# Patient Record
Sex: FEMALE | Race: WHITE | Employment: FULL TIME
[De-identification: names, ages, dates, MRNs, and addresses within clinical notes are randomized per-mention and may not be internally consistent; named-entity substitution may affect disease eponyms.]

---

## 2017-03-14 ENCOUNTER — HOSPITAL ENCOUNTER (OUTPATIENT)
Dept: MRI IMAGING | Facility: CLINIC | Age: 42
Discharge: HOME | End: 2017-03-14
Payer: COMMERCIAL

## 2017-03-14 DIAGNOSIS — M25.572 LEFT ANKLE PAIN, UNSPECIFIED CHRONICITY: ICD-10-CM

## 2017-03-14 PROCEDURE — 73721 MRI JNT OF LWR EXTRE W/O DYE: CPT

## 2017-09-28 ENCOUNTER — HOSPITAL ENCOUNTER (OUTPATIENT)
Dept: CT IMAGING | Facility: CLINIC | Age: 42
Discharge: HOME | End: 2017-09-28
Payer: COMMERCIAL

## 2017-09-28 DIAGNOSIS — M79.672 PAIN OF LEFT HEEL: ICD-10-CM

## 2017-09-28 PROCEDURE — 73700 CT LOWER EXTREMITY W/O DYE: CPT

## 2018-06-18 ENCOUNTER — OFFICE VISIT (OUTPATIENT)
Dept: ORTHOPEDIC SURGERY | Age: 43
End: 2018-06-18
Payer: COMMERCIAL

## 2018-06-18 VITALS
SYSTOLIC BLOOD PRESSURE: 117 MMHG | DIASTOLIC BLOOD PRESSURE: 85 MMHG | WEIGHT: 216.8 LBS | HEART RATE: 93 BPM | BODY MASS INDEX: 34.03 KG/M2 | HEIGHT: 67 IN

## 2018-06-18 DIAGNOSIS — M25.375 SUBTALAR JOINT INSTABILITY, LEFT: Primary | ICD-10-CM

## 2018-06-18 PROCEDURE — 99213 OFFICE O/P EST LOW 20 MIN: CPT | Performed by: ORTHOPAEDIC SURGERY

## 2018-06-18 RX ORDER — CITALOPRAM 40 MG/1
40 TABLET ORAL
COMMUNITY
Start: 2016-04-18

## 2018-06-18 RX ORDER — IBUPROFEN 800 MG/1
800 TABLET ORAL
Status: ON HOLD | COMMUNITY
Start: 2016-05-27 | End: 2020-10-23 | Stop reason: HOSPADM

## 2018-06-18 RX ORDER — HYDROXYZINE PAMOATE 25 MG/1
25 CAPSULE ORAL
COMMUNITY
Start: 2018-02-27

## 2018-06-18 RX ORDER — TRAZODONE HYDROCHLORIDE 50 MG/1
TABLET ORAL
COMMUNITY
Start: 2018-03-02 | End: 2020-10-09

## 2018-06-18 RX ORDER — TRAMADOL HYDROCHLORIDE 50 MG/1
50 TABLET ORAL
Status: ON HOLD | COMMUNITY
Start: 2016-11-28 | End: 2020-10-23 | Stop reason: HOSPADM

## 2018-06-18 RX ORDER — GABAPENTIN 300 MG/1
CAPSULE ORAL
COMMUNITY
Start: 2018-03-09

## 2018-06-18 RX ORDER — ORPHENADRINE CITRATE 100 MG/1
100 TABLET, EXTENDED RELEASE ORAL
Status: ON HOLD | COMMUNITY
Start: 2017-09-26 | End: 2020-10-23 | Stop reason: HOSPADM

## 2018-07-16 ENCOUNTER — OFFICE VISIT (OUTPATIENT)
Dept: ORTHOPEDIC SURGERY | Age: 43
End: 2018-07-16
Payer: COMMERCIAL

## 2018-07-16 VITALS
HEART RATE: 100 BPM | DIASTOLIC BLOOD PRESSURE: 73 MMHG | WEIGHT: 219 LBS | BODY MASS INDEX: 34.37 KG/M2 | SYSTOLIC BLOOD PRESSURE: 110 MMHG | HEIGHT: 67 IN

## 2018-07-16 DIAGNOSIS — M19.072 ARTHRITIS OF LEFT ANKLE: Primary | ICD-10-CM

## 2018-07-16 PROCEDURE — 20610 DRAIN/INJ JOINT/BURSA W/O US: CPT | Performed by: ORTHOPAEDIC SURGERY

## 2018-07-16 RX ORDER — BETAMETHASONE SODIUM PHOSPHATE AND BETAMETHASONE ACETATE 3; 3 MG/ML; MG/ML
12 INJECTION, SUSPENSION INTRA-ARTICULAR; INTRALESIONAL; INTRAMUSCULAR; SOFT TISSUE ONCE
Status: COMPLETED | OUTPATIENT
Start: 2018-07-16 | End: 2018-07-16

## 2018-07-16 RX ORDER — BUPIVACAINE HYDROCHLORIDE 5 MG/ML
30 INJECTION, SOLUTION PERINEURAL ONCE
Status: COMPLETED | OUTPATIENT
Start: 2018-07-16 | End: 2018-07-16

## 2018-07-16 RX ADMIN — BETAMETHASONE SODIUM PHOSPHATE AND BETAMETHASONE ACETATE 12 MG: 3; 3 INJECTION, SUSPENSION INTRA-ARTICULAR; INTRALESIONAL; INTRAMUSCULAR; SOFT TISSUE at 16:27

## 2018-07-16 RX ADMIN — BUPIVACAINE HYDROCHLORIDE 150 MG: 5 INJECTION, SOLUTION PERINEURAL at 16:27

## 2018-07-16 NOTE — PROGRESS NOTES
Subjective:      Patient ID: Aruna Alston is a 38 y.o. female. HPI  Patient comes in today for evaluation of her left foot. She had a calcaneus fracture and has developed posttraumatic subtalar arthritis. We did get approval for injection. Continues to have pain especially with weightbearing it's achy deep pain also some swelling with the weather   Current Outpatient Prescriptions   Medication Sig Dispense Refill    citalopram (CELEXA) 40 MG tablet Take 40 mg by mouth      ibuprofen (ADVIL;MOTRIN) 800 MG tablet Take 800 mg by mouth      traMADol (ULTRAM) 50 MG tablet Take 50 mg by mouth. [place] gabapentin (NEURONTIN) 300 MG capsule 3 tabs q HS      hydrOXYzine (VISTARIL) 25 MG capsule Take 25 mg by mouth      orphenadrine (NORFLEX) 100 MG extended release tablet Take 100 mg by mouth      traZODone (DESYREL) 50 MG tablet 1/2 to 2 tabs q hs prn for insomnia       Current Facility-Administered Medications   Medication Dose Route Frequency Provider Last Rate Last Dose    betamethasone acetate-betamethasone sodium phosphate (CELESTONE) injection 12 mg  12 mg Intra-articular Once Yesi Live MD        bupivacaine (MARCAINE) 0.5 % injection 150 mg  30 mL Intra-articular Once Yesi Live MD         Review of Systems  Past Medical History:   Diagnosis Date    Anxiety     Depression     Fibromyalgia      No past surgical history on file. Family History   Problem Relation Age of Onset    Hypertension Mother      Social History   Substance Use Topics    Smoking status: Never Smoker    Smokeless tobacco: Never Used    Alcohol use Yes       Objective:     Vitals:    07/16/18 1546   BP: 110/73   Pulse: 100   Weight: 219 lb (99.3 kg)   Height: 5' 7\" (1.702 m)     Physical Exam  On exam the patient is alert and oriented ×3 and appears well She does walk with a slight limp exam shows a valgus heel. She has limited tibiotalar and subtalar motion. She's tender across the subtalar joint.   He

## 2020-07-01 ENCOUNTER — OFFICE VISIT (OUTPATIENT)
Dept: ORTHOPEDIC SURGERY | Age: 45
End: 2020-07-01
Payer: COMMERCIAL

## 2020-07-01 VITALS
SYSTOLIC BLOOD PRESSURE: 130 MMHG | BODY MASS INDEX: 34.36 KG/M2 | HEART RATE: 102 BPM | WEIGHT: 218.92 LBS | HEIGHT: 67 IN | DIASTOLIC BLOOD PRESSURE: 93 MMHG

## 2020-07-01 PROCEDURE — 99213 OFFICE O/P EST LOW 20 MIN: CPT | Performed by: ORTHOPAEDIC SURGERY

## 2020-07-05 NOTE — PROGRESS NOTES
[facility]  [facility]  [facility] [address]  Dept: [phone]    Ambulatory Orthopedic Consult      CHIEF COMPLAINT:    Chief Complaint   Patient presents with    Ankle Pain     left ankle       HISTORY OF PRESENT ILLNESS:      The patient is a 45 y.o. female who is being seen for consultation and evaluation of pain at the left lateral hindfoot, which began due to an injury on 12/7/2016 secondary to a fall down stairs while at work (treated nonoperatively). The pain is described mainly with mechanical terms (dull/sharp/throbbing). The pain is worse with activity and better with rest. The patient reports a progressive course. The patient has tried:   [place]    [x]  rest/activity modification          [x]  NSAIDs      []  opiates      []  orthotics        []  change in shoes   []  home exercises  []  physical therapy      [x]  CAM boot     []  brace:    [x]  injection:       []  surgery:      She was previously seen by Dr. Erin Singer for this issue. REVIEW OF SYSTEMS:  Constitutional: Negative for fever. HENT: Negative for tinnitus. Eyes: Negative for pain. Respiratory: Negative for shortness of breath. Cardiovascular: Negative for chest pain. Gastrointestinal: Negative for abdominal pain. Genitourinary: Negative for dysuria. Skin: Negative for rash. Neurological: Negative for headaches. Hematological: Does not bruise/bleed easily. Musculoskeletal: See HPI for pertinent positives     Past Medical History:    She  has a past medical history of Anxiety, Depression, and Fibromyalgia. Past Surgical History:    She  has no past surgical history on file.      Current Medications:     Current Outpatient Medications:     citalopram (CELEXA) 40 MG tablet, Take 40 mg by mouth, Disp: , Rfl:     ibuprofen (ADVIL;MOTRIN) 800 MG tablet, Take 800 mg by mouth, Disp: , Rfl:     traMADol (ULTRAM) 50 MG tablet, Take 50 rash/lesions/AV malformations. Strength: Able to fire/perform the following with appropriate strength:    [x]  Tib Ant:     [x]  Gastroc-Soleus:         [x]  Inversion:    [x]  Eversion:         [x]  FHL:     [x]  EHL:      Motion:  Normal for the following joints:    [x]  Ankle:     []  Subtalar: Decreased      [x]  1st MTP:      []  1st TMT:            Tenderness to Palpation:    Tenderness to palpation: Diffusely throughout the lateral hindfoot and across the anterior ankle joint line      RADIOLOGY:   7/1/2020 FINDINGS:  Three weightbearing views (AP, Mortise, and Lateral) of the left ankle and three weightbearing views (AP, Oblique, Lateral) of the left foot and 2 weightbearing views (axial and lateral) of the bilateral calcaneus were obtained in the office today and reviewed, revealing no acute fracture, dislocation, or radioopaque foreign body/tumor. Degenerative changes of the left subtalar joint with joint narrowing, sclerosis, and osteophytes, along with loss of calcaneal length and height, as well as evidence of anterior ankle impingement    IMPRESSION:  No acute fracture/dislocation. Degenerative changes as above. Electronically signed by Janelle Dodd MD      ASSESSMENT AND PLAN:  Nayla Slater was seen today for Ankle Pain (left ankle)  The primary encounter diagnosis was Arthritis of subtalar joint. Diagnoses of Impingement syndrome of left ankle and Equinus contracture of ankle were also pertinent to this visit. Body mass index is 34.28 kg/m². She has left posttraumatic subtalar arthritis, with a history of a calcaneus fracture (treated nonoperatively), along with left anterior ankle impingement; this occurred secondary to an injury on 12/7/2016 at work. Notably, she has the past medical history as above, including but not limited to the following:  Psoriasis, fibromyalgia, and anxiety/depression.     I had a long discussion today with the patient about the likely diagnosis and its natural history, physical exam and imaging findings, as well as treatment options in detail. We discussed rest/activity modification, swelling control, NSAIDs/Acetaminophen/topical anesthetics, orthotics/shoewear modification, bracing/immobilization, injections, and physical therapy. Surgically, we discussed a left subtalar arthrodesis with calcaneal osteotomy (resource in height and slide medially), along with a SETH, and possible bone graft harvest.  While discussing surgery, she does state that she is [employer] Witness, and would not accept blood products during surgery (I do not foresee any problem with this, and I did ask her to be sure to tell the anesthesia team when the time comes as well). At this point, she does wish to proceed with surgery, having failed conservative management as above. The patient wishes to proceed with the recommendations as above. Orders/referrals were placed as below at today's visit. In order to know exactly how to proceed surgically, a CT was ordered today for preoperative planning. This is medically necessary to evaluate the exact bony alignment/architecture. Given the worker's compensation claim associated with the patient's diagnosis, appropriate paperwork will be filled out by our office regarding today's visit. All questions were answered and the patient agrees with the above plan. The patient will return to clinic after the CT has been obtained without x-rays. At her next visit, we will most likely pick a surgical date and submit surgical booking paperwork. No follow-ups on file. No orders of the defined types were placed in this encounter. Orders Placed This Encounter   Procedures    CT Ankle Left WO Contrast     Ankle/hindfoot: Reformats     [place] coronal reformats using axial image at the level of the tibial fibular syndesmosis. Adjust coronal reformats plane to bisect the tibia and fibula. Collimation 2 mm.     [place] the sagittal reformats plane using the same image, peripendicular to the coronal reformats plane. Collimation is 2 mm. Standing Status:   Future     Standing Expiration Date:   7/1/2021     Scheduling Instructions: Ankle must be at neutral (perpendicular to tibia) with toes pointed directly up. Please center the ankle in the scanner (to include 3 inches above tibial plafond), and include the entire foot. Order Specific Question:   Reason for exam:     Answer:   eval ST joint for preop planning    CT Foot Left WO Contrast     Fore/midfoot: Reformats     [place] axial reformats using routine reformat sagittal image that shows the entire first metatarsal (may have to oblique this image to see the entire first metatarsal). Adjust axial reformats plane to parallel the long axis of the first metatarsal. Collimation is 2 mm. [place] coronal reformats using the same image, perpendicular to the axial reformats. Collimation is 2 mm. [place] the sagittal reformats plane using the axial reformat image that shows the entire first metatarsal. Adjust the sagittal reformats plane to parallel the long axis of the first metatarsal. Collimation 2 mm. Standing Status:   Future     Standing Expiration Date:   7/1/2021     Scheduling Instructions: Ankle must be at neutral (perpendicular to tibia) with toes pointed directly up. Please center the ankle in the scanner (to include 3 inches above tibial plafond), and include the entire foot.      Order Specific Question:   Reason for exam:     Answer:   eval ST joint for preop planning    XR CALCANEUS LEFT (MIN 2 VIEWS)     2 WEIGHT BEARING VIEWS:  AXIAL + LATERAL (to include whole foot)     Standing Status:   Future     Number of Occurrences:   1     Standing Expiration Date:   8/1/2020     Order Specific Question:   Reason for exam:     Answer:   eval alignment    XR CALCANEUS RIGHT (MIN 2 VIEWS)     2 WEIGHT BEARING VIEWS:  AXIAL + LATERAL (to include whole foot)     Standing Status:

## 2020-08-17 ENCOUNTER — HOSPITAL ENCOUNTER (OUTPATIENT)
Dept: CT IMAGING | Facility: CLINIC | Age: 45
Discharge: HOME | End: 2020-08-19
Payer: COMMERCIAL

## 2020-08-17 PROCEDURE — 73700 CT LOWER EXTREMITY W/O DYE: CPT

## 2020-08-21 ENCOUNTER — OFFICE VISIT (OUTPATIENT)
Dept: ORTHOPEDIC SURGERY | Age: 45
End: 2020-08-21
Payer: COMMERCIAL

## 2020-08-21 VITALS
WEIGHT: 218 LBS | TEMPERATURE: 97.1 F | SYSTOLIC BLOOD PRESSURE: 131 MMHG | HEART RATE: 104 BPM | HEIGHT: 67 IN | DIASTOLIC BLOOD PRESSURE: 88 MMHG | BODY MASS INDEX: 34.21 KG/M2

## 2020-08-21 PROCEDURE — 99214 OFFICE O/P EST MOD 30 MIN: CPT | Performed by: ORTHOPAEDIC SURGERY

## 2020-08-21 NOTE — PROGRESS NOTES
[facility]  [facility]  [address] [place] [address]  Dept: [phone]    Ambulatory Orthopedic Consult      CHIEF COMPLAINT:    Chief Complaint   Patient presents with    Ankle Pain     left ankle       HISTORY OF PRESENT ILLNESS:      The patient is a 45 y.o. female who is being seen for consultation and evaluation of pain at the left lateral hindfoot, which began due to an injury on 12/7/2016 secondary to a fall down stairs while at work (treated nonoperatively). The pain is described mainly with mechanical terms (dull/sharp/throbbing). The pain is worse with activity and better with rest. The patient reports a progressive course. The patient has tried:   Veronica Oh    [x]  rest/activity modification          [x]  NSAIDs      []  opiates      []  orthotics        []  change in shoes   []  home exercises  []  physical therapy      [x]  CAM boot     []  brace:    [x]  injection:       []  surgery:      She was previously seen by Dr. Eamon Steiner for this issue. INTERVAL HISTORY 8/21/2020:  She is seen again today in the office for follow up of a CT scan. Since being seen last, the patient is doing worse. She is ambulating today using regular shoes without a brace or assistive device. The location and quality of the pain have not significantly changed since the last visit. She is here today with her brother to discuss surgery. REVIEW OF SYSTEMS:  Constitutional: Negative for fever. HENT: Negative for tinnitus. Eyes: Negative for pain. Respiratory: Negative for shortness of breath. Cardiovascular: Negative for chest pain. Gastrointestinal: Negative for abdominal pain. Genitourinary: Negative for dysuria. Skin: Negative for rash. Neurological: Negative for headaches. Hematological: Does not bruise/bleed easily.    Musculoskeletal: See HPI for pertinent positives     Past Medical History:    She  has a past medical history of Anxiety, Depression, and Fibromyalgia. Past Surgical History:    She  has no past surgical history on file. Current Medications:     Current Outpatient Medications:     citalopram (CELEXA) 40 MG tablet, Take 40 mg by mouth, Disp: , Rfl:     ibuprofen (ADVIL;MOTRIN) 800 MG tablet, Take 800 mg by mouth, Disp: , Rfl:     traMADol (ULTRAM) 50 MG tablet, Take 50 mg by mouth. ., Disp: , Rfl:     gabapentin (NEURONTIN) 300 MG capsule, 3 tabs q HS, Disp: , Rfl:     hydrOXYzine (VISTARIL) 25 MG capsule, Take 25 mg by mouth, Disp: , Rfl:     orphenadrine (NORFLEX) 100 MG extended release tablet, Take 100 mg by mouth, Disp: , Rfl:     traZODone (DESYREL) 50 MG tablet, 1/2 to 2 tabs q hs prn for insomnia, Disp: , Rfl:      Allergies:    Patient has no known allergies. Family History:  family history includes Hypertension in her mother. Social History:   Social History     Occupational History    Not on file   Tobacco Use    Smoking status: Never Smoker    Smokeless tobacco: Never Used   Substance and Sexual Activity    Alcohol use: Yes    Drug use: No    Sexual activity: Not on file     Occupation: Full-time medical billing. 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 She reports that her   in 2019 from stage IV colon cancer. OBJECTIVE:  /88   Pulse 104   Temp 97.1 °F (36.2 °C)   Ht 5' 7\" (1.702 m)   Wt 218 lb (98.9 kg)   BMI 34.14 kg/m²    Psych: alert and oriented to person, time, and place  Cardio:  well perfused extremities  Resp:  normal respiratory effort  Skin:  no cyanosis  Hem/lymph:  no lymphedema  Neuro:  sensation to light touch grossly intact throughout all nerve distributions in the foot   Musculoskeletal:    RLE:  Alignment:  Heel neutral   Vascular: Toes warm and well perfused, compartments soft/compressible. No significant swelling of foot. Skin: Intact without rash/lesions/AV malformations.   Strength: Able to fire/perform the following with appropriate strength:    [x]  Tib Ant:     [x]  Gastroc-Soleus:         [x]  Inversion:    [x]  Eversion:         [x]  FHL:     [x]  EHL:      Motion:  Normal for the following joints:    [x]  Ankle:     [x]  Subtalar:       [x]  1st MTP:      []  1st TMT:            Tenderness to Palpation:    Tenderness to palpation: None      LLE:  Alignment:  Heel valgus, asymmetric to contralateral  Vascular: Toes warm and well perfused, compartments soft/compressible. No significant swelling of foot. Skin: Intact without rash/lesions/AV malformations. Strength: Able to fire/perform the following with appropriate strength:    [x]  Tib Ant:     [x]  Gastroc-Soleus:         [x]  Inversion:    [x]  Eversion:         [x]  FHL:     [x]  EHL:      Motion:  Normal for the following joints:    [x]  Ankle:     []  Subtalar: Decreased      [x]  1st MTP:      []  1st TMT:            Tenderness to Palpation:    Tenderness to palpation: Diffusely throughout the lateral hindfoot and across the anterior ankle joint line      RADIOLOGY:   8/21/2020 Prior images reviewed for reference. CT images and radiology report reviewed, as below:    CT ankle:         1. Calcaneus/hindfoot valgus.  Moderate degenerative changes of the subtalar    joint.  Diffuse osteopenia. 2. Os trigonum. 3. Mild soft tissue swelling and edema of the medial left great toe.  Mild    edema in the subcutaneous fat about the ankle and foot. 4. No acute osseous abnormality. CT foot:         1. Calcaneus/hindfoot valgus.  Moderate degenerative changes at the subtalar    joint.  Diffuse osteopenia. 2. Os trigonum. 3. Mild edema and soft tissue swelling at the medial left great toe and about    the ankle and foot.     4. No acute osseous abnormality.                 FINDINGS:  Three weightbearing views (AP, Mortise, and Lateral) of the left ankle and three weightbearing views (AP, Oblique, Lateral) of the left foot and 2 weightbearing views (axial and lateral) of the bilateral calcaneus were obtained in the office today and reviewed, revealing no acute fracture, dislocation, or radioopaque foreign body/tumor. Degenerative changes of the left subtalar joint with joint narrowing, sclerosis, and osteophytes, along with loss of calcaneal length and height, as well as evidence of anterior ankle impingement    IMPRESSION:  No acute fracture/dislocation. Degenerative changes as above. Electronically signed by Marissa Mullins MD      ASSESSMENT AND PLAN:  Becca Rhodes was seen today for Ankle Pain (left ankle)  The primary encounter diagnosis was Arthritis of subtalar joint. Diagnoses of Impingement syndrome of left ankle and Equinus contracture of ankle were also pertinent to this visit. Body mass index is 34.14 kg/m². She has left posttraumatic subtalar arthritis, with a history of a calcaneus fracture (treated nonoperatively), along with left anterior ankle impingement; this occurred secondary to an injury on 12/7/2016 at work. Notably, she has the past medical history as above, including but not limited to the following:  Psoriasis, fibromyalgia, and anxiety/depression. I had another discussion today with the patient about the likely diagnosis and its natural history, physical exam and imaging findings, as well as treatment options in detail. We discussed rest/activity modification, swelling control, NSAIDs/Acetaminophen/topical anesthetics, orthotics/shoewear modification, bracing/immobilization, injections, and physical therapy. Surgically, we discussed a left subtalar arthrodesis with calcaneal osteotomy (possible medial slide by about 10 mm plus about 7 to 8 mm of height), SETH and possible bone graft harvest.  She has tried extensive conservative management in the past for this problem, including rest/activity modification, NSAIDs, cam boot, and an injection, without any significant improvement.   Due to the severity of her arthritis, I do not DME Order for (Specify) as OP     - DME device ordered:  rolling walker   - Length of Need:  3 Months    DME Order for (Specify) as OP     - DME device ordered:  rolling knee scooter   - Length of Need:  3 Months    DME Order for (Specify) as OP     - DME device ordered:  crutches   - Length of Need:  3 Months         Minda Minor MD  Orthopedic Surgery, Foot and Ankle        Please excuse any typos/errors, as this note was created with the assistance of voice recognition software. While intending to generate a document that actually reflects the content of the visit, the document can still have some errors including those of syntax and sound-a-like substitutions which may escape proof reading. In such instances, actual meaning can be extrapolated by context.

## 2020-09-29 ENCOUNTER — TELEPHONE (OUTPATIENT)
Dept: ORTHOPEDIC SURGERY | Age: 45
End: 2020-09-29

## 2020-09-29 NOTE — TELEPHONE ENCOUNTER
LM on [address] [place] [address] letting her know I was returning her call to see what information she still needed regarding Luiza's approved Surgery.   Requested a CB to review

## 2020-10-15 ENCOUNTER — HOSPITAL ENCOUNTER (OUTPATIENT)
Dept: PHYSICAL THERAPY | Facility: CLINIC | Age: 45
Setting detail: THERAPIES SERIES
Discharge: HOME | End: 2020-10-15
Payer: COMMERCIAL

## 2020-10-15 PROCEDURE — 97161 PT EVAL LOW COMPLEX 20 MIN: CPT

## 2020-10-15 PROCEDURE — 97116 GAIT TRAINING THERAPY: CPT

## 2020-10-15 NOTE — CONSULTS
[x] THE [facility] &  Therapy  [facility] [address]   [place]: [phone]  F: [phone]         Physical Therapy Evaluation    Date:  10/15/2020   Patient: Марина Meehan  : 1975  MRN: [id number]  Physician: Dr Wilberto Ochoa: Imelda Nesbitt (VERIFICATION PENDING)  Medical Diagnosis: LLE Ankle OA subtalar, impingement syndrome, equinus contracture   Rehab Codes: M19.079, M25.872, M24.573  Onset date: 16   Next 's appt.:     Subjective:   CC/HPI: Pt with LLE lateral hindfoot pain, injured on 16 after falling down the stairs at work. She has failed conservative treatment and has opted for surgery on 10-23-20  for left subtalar arthrodesis with calcaneal- osteotomy, SETH and possible bone graft harvest.      PMHx:   has a past medical history of Anxiety, Depression, and Fibromyalgia. Tests: [] X-Ray:    [] MRI:    [x] Other:   CT ankle:         1. Calcaneus/hindfoot valgus.  Moderate degenerative changes of the subtalar    joint.  Diffuse osteopenia. 2. Os trigonum. 3. Mild soft tissue swelling and edema of the medial left great toe.  Mild    edema in the subcutaneous fat about the ankle and foot. 4. No acute osseous abnormality. CT foot:         1. Calcaneus/hindfoot valgus.  Moderate degenerative changes at the subtalar    joint.  Diffuse osteopenia. 2. Os trigonum. 3. Mild edema and soft tissue swelling at the medial left great toe and about    the ankle and foot. 4. No acute osseous abnormality.        -  Medications:  [x] Refer to full medical record [] None [] Other:  Allergies:       [x] Refer to full medical record [] None [] Other:        Martial Status Lives alone   Home type Condo   Stairs from outside -   Stairs inside Chair lift inside    [employer] Work from home   Job status DME billing   Work Activities/duties  computer   Recreational Activities        Pain present?  yes   Location LLE foot ankle to toes    Pain Rating currently 6/10   Description of pain ache   Altered Sensation Intact   What makes it worse WB   What makes it better Rest, meds   Pain altered treatment/action Pain limits ADLs   Sleep Sleeping ok              Objective:    ROM:  LLE WNLs                 Strength:   LLE WNLs            Educated pt on use of DME, including: (Check box of device used)     [x] Knee Scooter: Instructed pt on appropriate height being even with contralateral knee. Reviewed safety concerns such as not gliding on turns and using breaks appropriately. [x] Rolling Walker: Instructed pt on appropriate height of even with wrists with arms at resting at side. Educated pt on safe gait pattern while using walker. Explained to pt the difference between a 4 wheeled walker and rolling walker and how a rolling walker is most appropriate for a NWB pt. [x] Axillary Crutches: Instructed pt on appropriate height of two finger width between crutch and axilla, as well as elbow flexion of approximately 20deg. Educated pt on how to adjust both crutch and handle height. Pt with good understanding of all techniques/uses and expressed no safety concerns. At this time pt will most benefit from use of: crutches, knee scooter      [x] Stairs:          Comments: Pt able to demo gait with axillary crutches NWB        Assessment:  STG: (to be met in 1 treatments)  1. Educate patient on proper use of DME   2. Patient to perform transfers and weight bearing status independent without assistance   3. Independent with Home Exercise Programs  4.  Demonstrate Knowledge of fall prevention                     Patient goals: Gait     Rehab Potential:  [x] Good  [] Fair  [] Poor   Suggested Professional Referral:  [x] No  [] Yes:  Barriers to Goal Achievement[de-identified]  [x] No  [] Yes:  Domestic Concerns:  [x] No  [] Yes:    Pt. Education:  [x] Plans/Goals, Risks/Benefits discussed  [x] Home exercise program    Method of Education: [x] Verbal  [x] Demo  [] Written  Comprehension of Education:  [x] Verbalizes understanding. [x] Demonstrates understanding. [] Needs Review. [] Demonstrates/verbalizes understanding of HEP/Ed previously given. Treatment Plan:  [x] Therapeutic Exercise      [x] Manual Therapy       [x] Instruction in HEP        [] Neuromuscular Re-education     [] Vasocompression Melany Orellana        [x] Gait Training                      []  Medication allergies reviewed for use of    Dexamethasone Sodium Phosphate 4mg/ml     with iontophoresis treatments. Pt is not allergic. Frequency:  1-2 x/week for 2 visits      Todays Treatment:    Gait training as above      Evaluation Complexity:  History (Personal factors, comorbidities) [x] 0 [] 1-2 [] 3+   Exam (limitations, restrictions) [x] 1-2 [] 3 [] 4+   Clinical presentation (progression) [x] Stable [] Evolving  [] Unstable   Decision Making [x] Low [] Moderate [] High    [x] Low Complexity [] Moderate Complexity [] High Complexity       Treatment Charges: Mins Units   [x] Evaluation       [x]  Low       []  Moderate       []  High 20 1   []  Modalities     []  Ther Exercise     []  Manual Therapy     []  Ther Activities     []  Aquatics     []  Vasocompression     [x]  Gait  10 1     TOTAL TREATMENT TIME: 30    Time in:1425   Time PQD:8424    Electronically signed by: Dva Lomeli PT        Physician Signature:________________________________Date:__________________  By signing above or cosigning this note, I have reviewed this plan of care and certify a need for medically necessary rehabilitation services.      *PLEASE SIGN ABOVE AND FAX BACK ALL PAGES*

## 2020-10-19 ENCOUNTER — HOSPITAL ENCOUNTER (OUTPATIENT)
Dept: PREADMISSION TESTING | Age: 45
Setting detail: SPECIMEN
Discharge: HOME | End: 2020-10-23
Payer: COMMERCIAL

## 2020-10-19 PROCEDURE — U0003 INFECTIOUS AGENT DETECTION BY NUCLEIC ACID (DNA OR RNA); SEVERE ACUTE RESPIRATORY SYNDROME CORONAVIRUS 2 (SARS-COV-2) (CORONAVIRUS DISEASE [COVID-19]), AMPLIFIED PROBE TECHNIQUE, MAKING USE OF HIGH THROUGHPUT TECHNOLOGIES AS DESCRIBED BY CMS-2020-01-R: HCPCS

## 2020-10-20 LAB
SARS-COV-2, RAPID: NORMAL
SARS-COV-2: NORMAL
SARS-COV-2: NOT DETECTED
SOURCE: NORMAL

## 2020-10-21 ENCOUNTER — OFFICE VISIT (OUTPATIENT)
Dept: ORTHOPEDIC SURGERY | Age: 45
End: 2020-10-21
Payer: COMMERCIAL

## 2020-10-21 VITALS — TEMPERATURE: 97.2 F | HEIGHT: 67 IN | BODY MASS INDEX: 34.53 KG/M2 | WEIGHT: 220 LBS | RESPIRATION RATE: 16 BRPM

## 2020-10-21 PROCEDURE — 99213 OFFICE O/P EST LOW 20 MIN: CPT | Performed by: ORTHOPAEDIC SURGERY

## 2020-10-21 NOTE — PROGRESS NOTES
[facility]  [facility]  [address] [place] [address]  Dept: [phone]    Ambulatory Orthopedic Consult      CHIEF COMPLAINT:    Chief Complaint   Patient presents with    Foot Pain     Left Foot       HISTORY OF PRESENT ILLNESS:      The patient is a 39 y.o. female who is being seen for consultation and evaluation of pain at the left lateral hindfoot, which began due to an injury on 12/7/2016 secondary to a fall down stairs while at work (treated nonoperatively). The pain is described mainly with mechanical terms (dull/sharp/throbbing). The pain is worse with activity and better with rest. The patient reports a progressive course. The patient has tried:   Bobbye Brace    [x]  rest/activity modification          [x]  NSAIDs      []  opiates      []  orthotics        []  change in shoes   []  home exercises  []  physical therapy      [x]  CAM boot     []  brace:    [x]  injection:       []  surgery:      She was previously seen by Dr. Shon Reyes for this issue. INTERVAL HISTORY 8/21/2020:  She is seen again today in the office for follow up of a CT scan. Since being seen last, the patient is doing worse. She is ambulating today using regular shoes without a brace or assistive device. The location and quality of the pain have not significantly changed since the last visit. She is here today with her brother to discuss surgery. INTERVAL HISTORY 10/21/2020:  She is seen again today in the office for follow up of a previous issue (as above). Since being seen last, she reports the problem has not significantly improved. At today's visit, she is using no brace or assistive device. The location and quality of the pain have not significantly changed since the last visit. She is here today for a preoperative visit, and wishes to proceed with surgery as planned. She denies any other significant changes in medical history.          REVIEW OF and oriented to person, time, and place  Cardio:  well perfused extremities  Resp:  normal respiratory effort  Skin:  no cyanosis  Hem/lymph:  no lymphedema  Neuro:  sensation to light touch grossly intact throughout all nerve distributions in the foot   Musculoskeletal:    RLE:  Alignment:  Heel neutral   Vascular: Toes warm and well perfused, compartments soft/compressible. No significant swelling of foot. Skin: Intact without rash/lesions/AV malformations. Strength: Able to fire/perform the following with appropriate strength:    [x]  Tib Ant:     [x]  Gastroc-Soleus:         [x]  Inversion:    [x]  Eversion:         [x]  FHL:     [x]  EHL:      Motion:  Normal for the following joints:    [x]  Ankle:     [x]  Subtalar:       [x]  1st MTP:      []  1st TMT:            Tenderness to Palpation:    Tenderness to palpation: None      LLE:  Alignment:  Heel valgus, asymmetric to contralateral  Vascular: Toes warm and well perfused, compartments soft/compressible. No significant swelling of foot. Skin: Intact without rash/lesions/AV malformations. Strength: Able to fire/perform the following with appropriate strength:    [x]  Tib Ant:     [x]  Gastroc-Soleus:         [x]  Inversion:    [x]  Eversion:         [x]  FHL:     [x]  EHL:      Motion:  Normal for the following joints:    [x]  Ankle:     []  Subtalar: Decreased      [x]  1st MTP:      []  1st TMT:            Tenderness to Palpation:    Tenderness to palpation: Diffusely throughout the lateral hindfoot and across the anterior ankle joint line      RADIOLOGY:   10/21/2020 Prior images reviewed for reference. CT images and radiology report reviewed, as below:    CT ankle:         1. Calcaneus/hindfoot valgus.  Moderate degenerative changes of the subtalar    joint.  Diffuse osteopenia. 2. Os trigonum. 3. Mild soft tissue swelling and edema of the medial left great toe.  Mild    edema in the subcutaneous fat about the ankle and foot.     4. No acute osseous abnormality. CT foot:         1. Calcaneus/hindfoot valgus.  Moderate degenerative changes at the subtalar    joint.  Diffuse osteopenia. 2. Os trigonum. 3. Mild edema and soft tissue swelling at the medial left great toe and about    the ankle and foot. 4. No acute osseous abnormality.                 FINDINGS:  Three weightbearing views (AP, Mortise, and Lateral) of the left ankle and three weightbearing views (AP, Oblique, Lateral) of the left foot and 2 weightbearing views (axial and lateral) of the bilateral calcaneus were obtained in the office today and reviewed, revealing no acute fracture, dislocation, or radioopaque foreign body/tumor. Degenerative changes of the left subtalar joint with joint narrowing, sclerosis, and osteophytes, along with loss of calcaneal length and height, as well as evidence of anterior ankle impingement    IMPRESSION:  No acute fracture/dislocation. Degenerative changes as above. 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 Diagnoses of Impingement syndrome of left ankle and Equinus contracture of ankle were also pertinent to this visit. Body mass index is 34.46 kg/m². She has left posttraumatic subtalar arthritis, with a history of a calcaneus fracture (treated nonoperatively), along with left anterior ankle impingement; this occurred secondary to an injury on 12/7/2016 at work. Notably, she has the past medical history as above, including but not limited to the following:  Psoriasis, fibromyalgia, and anxiety/depression. I had another discussion today with the patient about the likely diagnosis and its natural history, physical exam and imaging findings, as well as treatment options in detail.  We discussed rest/activity modification, swelling control, NSAIDs/Acetaminophen/topical anesthetics, orthotics/shoewear modification, bracing/immobilization, injections, and physical therapy. Surgically, we have discussed a left subtalar fusion with possible calcaneal osteotomy (possible medial slide by about 10 mm plus about 7 8 mm of height, possibly depending on soft tissue), with SETH, and bone grafting. We did discuss bone grafting at length at today's visit, and she is okay with allograft, local autograft, as well as the possibility of using bone substitute (we have discussed options such as calcium triphosphate, platelet derived growth factor, and others), and she is okay with the use of this. She does wish to proceed with surgery as planned. The patient wishes to proceed with the recommendations as above. Orders/referrals were placed as below at today's visit. We spoke about the risks/benefits/alternatives to a surgical intervention. They understand that the risks of surgery may include but are not limited to pain, infection, bleeding, blood clot, damage to soft tissue/vessel/nerve, future surgery, scarring/stiffness, decreased strength/weakness, cosmetic deformity, neuroma/neuritis/phantom pains, delayed soft tissue/bone healing, nonunion, malunion, failure of hardware/fixation/surgery, iatrogenic fracture/dislocation, damage to bone/joint(s), worsening of condition, recurrence, limb length discrepancy, avascular necrosis of bone, neurovascular compromise/compartment syndrome, tourniquet complications, failure of surgery, dissatisfaction with outcome, loss of limb, stroke, heart attack, pulmonary embolus, mental status change, anesthesia risks/reaction, and even death. They expressed verbal understanding of the risks and wish to proceed with surgical intervention. All questions were answered. No guarantees were made or implied. Informed consent was obtained. Notably, she does not consent to blood transfusion, and this was removed from her surgical consent form.     The patient was counseled

## 2020-10-23 ENCOUNTER — HOSPITAL ENCOUNTER (OUTPATIENT)
Age: 45
Setting detail: OUTPATIENT SURGERY
Discharge: HOME | End: 2020-10-23
Attending: ORTHOPAEDIC SURGERY | Admitting: ORTHOPAEDIC SURGERY
Payer: COMMERCIAL

## 2020-10-23 ENCOUNTER — APPOINTMENT (OUTPATIENT)
Dept: GENERAL RADIOLOGY | Age: 45
End: 2020-10-23
Attending: ORTHOPAEDIC SURGERY
Payer: COMMERCIAL

## 2020-10-23 ENCOUNTER — ANESTHESIA (OUTPATIENT)
Dept: OPERATING ROOM | Age: 45
End: 2020-10-23
Payer: COMMERCIAL

## 2020-10-23 ENCOUNTER — ANESTHESIA EVENT (OUTPATIENT)
Dept: OPERATING ROOM | Age: 45
End: 2020-10-23
Payer: COMMERCIAL

## 2020-10-23 VITALS
SYSTOLIC BLOOD PRESSURE: 126 MMHG | HEIGHT: 67 IN | TEMPERATURE: 97.2 F | HEART RATE: 115 BPM | DIASTOLIC BLOOD PRESSURE: 102 MMHG | BODY MASS INDEX: 34.53 KG/M2 | WEIGHT: 220 LBS | RESPIRATION RATE: 19 BRPM | OXYGEN SATURATION: 98 %

## 2020-10-23 VITALS — TEMPERATURE: 82.4 F | DIASTOLIC BLOOD PRESSURE: 50 MMHG | SYSTOLIC BLOOD PRESSURE: 93 MMHG | OXYGEN SATURATION: 100 %

## 2020-10-23 LAB — HCG, PREGNANCY URINE (POC): NEGATIVE

## 2020-10-23 PROCEDURE — C1769 GUIDE WIRE: HCPCS | Performed by: ORTHOPAEDIC SURGERY

## 2020-10-23 PROCEDURE — 3209999900 FLUORO FOR SURGICAL PROCEDURES

## 2020-10-23 PROCEDURE — 3600000013 HC SURGERY LEVEL 3 ADDTL 15MIN: Performed by: ORTHOPAEDIC SURGERY

## 2020-10-23 PROCEDURE — 3700000001 HC ADD 15 MINUTES (ANESTHESIA): Performed by: ORTHOPAEDIC SURGERY

## 2020-10-23 PROCEDURE — 3600000003 HC SURGERY LEVEL 3 BASE: Performed by: ORTHOPAEDIC SURGERY

## 2020-10-23 PROCEDURE — 6370000000 HC RX 637 (ALT 250 FOR IP): Performed by: ANESTHESIOLOGY

## 2020-10-23 PROCEDURE — 6360000002 HC RX W HCPCS: Performed by: ANESTHESIOLOGY

## 2020-10-23 PROCEDURE — 2500000003 HC RX 250 WO HCPCS: Performed by: ANESTHESIOLOGY

## 2020-10-23 PROCEDURE — 6360000002 HC RX W HCPCS: Performed by: NURSE ANESTHETIST, CERTIFIED REGISTERED

## 2020-10-23 PROCEDURE — 2500000003 HC RX 250 WO HCPCS: Performed by: NURSE ANESTHETIST, CERTIFIED REGISTERED

## 2020-10-23 PROCEDURE — 2580000003 HC RX 258: Performed by: ANESTHESIOLOGY

## 2020-10-23 PROCEDURE — 27606 INCISION OF ACHILLES TENDON: CPT | Performed by: ORTHOPAEDIC SURGERY

## 2020-10-23 PROCEDURE — 6360000002 HC RX W HCPCS: Performed by: ORTHOPAEDIC SURGERY

## 2020-10-23 PROCEDURE — 7100000000 HC PACU RECOVERY - FIRST 15 MIN: Performed by: ORTHOPAEDIC SURGERY

## 2020-10-23 PROCEDURE — 76942 ECHO GUIDE FOR BIOPSY: CPT | Performed by: ANESTHESIOLOGY

## 2020-10-23 PROCEDURE — C1713 ANCHOR/SCREW BN/BN,TIS/BN: HCPCS | Performed by: ORTHOPAEDIC SURGERY

## 2020-10-23 PROCEDURE — 7100000001 HC PACU RECOVERY - ADDTL 15 MIN: Performed by: ORTHOPAEDIC SURGERY

## 2020-10-23 PROCEDURE — 7100000011 HC PHASE II RECOVERY - ADDTL 15 MIN: Performed by: ORTHOPAEDIC SURGERY

## 2020-10-23 PROCEDURE — 2709999900 HC NON-CHARGEABLE SUPPLY: Performed by: ORTHOPAEDIC SURGERY

## 2020-10-23 PROCEDURE — 3700000000 HC ANESTHESIA ATTENDED CARE: Performed by: ORTHOPAEDIC SURGERY

## 2020-10-23 PROCEDURE — 81025 URINE PREGNANCY TEST: CPT

## 2020-10-23 PROCEDURE — C9359 IMPLNT,BON VOID FILLER-PUTTY: HCPCS | Performed by: ORTHOPAEDIC SURGERY

## 2020-10-23 PROCEDURE — C1734 ORTH/DEVIC/DRUG BN/BN,TIS/BN: HCPCS | Performed by: ORTHOPAEDIC SURGERY

## 2020-10-23 PROCEDURE — 2720000010 HC SURG SUPPLY STERILE: Performed by: ORTHOPAEDIC SURGERY

## 2020-10-23 PROCEDURE — 6370000000 HC RX 637 (ALT 250 FOR IP): Performed by: ORTHOPAEDIC SURGERY

## 2020-10-23 PROCEDURE — 7100000010 HC PHASE II RECOVERY - FIRST 15 MIN: Performed by: ORTHOPAEDIC SURGERY

## 2020-10-23 PROCEDURE — 28725 ARTHRODESIS SUBTALAR: CPT | Performed by: ORTHOPAEDIC SURGERY

## 2020-10-23 DEVICE — CANNULATED SCREW
Type: IMPLANTABLE DEVICE | Site: ANKLE | Status: FUNCTIONAL
Brand: ASNIS

## 2020-10-23 DEVICE — TIM-GRAFT BONE AUGMENT 3ML INJ: Type: IMPLANTABLE DEVICE | Site: ANKLE | Status: FUNCTIONAL

## 2020-10-23 DEVICE — C BONE PUTTY WITH DBM AND CANCELLOUS BONE CHIPS
Type: IMPLANTABLE DEVICE | Site: ANKLE | Status: FUNCTIONAL
Brand: ALLOMATRIX

## 2020-10-23 RX ORDER — ASPIRIN 325 MG
325 TABLET, DELAYED RELEASE (ENTERIC COATED) ORAL DAILY
Qty: 42 TABLET | Refills: 0 | Status: SHIPPED | OUTPATIENT
Start: 2020-10-23 | End: 2020-12-10

## 2020-10-23 RX ORDER — FENTANYL CITRATE 50 UG/ML
25 INJECTION, SOLUTION INTRAMUSCULAR; INTRAVENOUS EVERY 5 MIN PRN
Status: DISCONTINUED | OUTPATIENT
Start: 2020-10-23 | End: 2020-10-23 | Stop reason: HOSPADM

## 2020-10-23 RX ORDER — DOCUSATE SODIUM 100 MG/1
100 CAPSULE, LIQUID FILLED ORAL 2 TIMES DAILY PRN
Qty: 20 CAPSULE | Refills: 0 | Status: SHIPPED | OUTPATIENT
Start: 2020-10-23 | End: 2020-10-30

## 2020-10-23 RX ORDER — SODIUM CHLORIDE 0.9 % (FLUSH) 0.9 %
10 SYRINGE (ML) INJECTION PRN
Status: DISCONTINUED | OUTPATIENT
Start: 2020-10-23 | End: 2020-10-23 | Stop reason: HOSPADM

## 2020-10-23 RX ORDER — LIDOCAINE HYDROCHLORIDE 10 MG/ML
1 INJECTION, SOLUTION EPIDURAL; INFILTRATION; INTRACAUDAL; PERINEURAL
Status: DISCONTINUED | OUTPATIENT
Start: 2020-10-23 | End: 2020-10-23 | Stop reason: HOSPADM

## 2020-10-23 RX ORDER — FENTANYL CITRATE 50 UG/ML
INJECTION, SOLUTION INTRAMUSCULAR; INTRAVENOUS PRN
Status: DISCONTINUED | OUTPATIENT
Start: 2020-10-23 | End: 2020-10-23 | Stop reason: SDUPTHER

## 2020-10-23 RX ORDER — CEFAZOLIN SODIUM 1 G/3ML
INJECTION, POWDER, FOR SOLUTION INTRAMUSCULAR; INTRAVENOUS PRN
Status: DISCONTINUED | OUTPATIENT
Start: 2020-10-23 | End: 2020-10-23 | Stop reason: SDUPTHER

## 2020-10-23 RX ORDER — ONDANSETRON 4 MG/1
4 TABLET, FILM COATED ORAL EVERY 8 HOURS PRN
Qty: 20 TABLET | Refills: 0 | Status: SHIPPED | OUTPATIENT
Start: 2020-10-23 | End: 2020-10-30

## 2020-10-23 RX ORDER — PROMETHAZINE HYDROCHLORIDE 25 MG/ML
6.25 INJECTION, SOLUTION INTRAMUSCULAR; INTRAVENOUS
Status: DISCONTINUED | OUTPATIENT
Start: 2020-10-23 | End: 2020-10-23 | Stop reason: HOSPADM

## 2020-10-23 RX ORDER — HYDRALAZINE HYDROCHLORIDE 20 MG/ML
5 INJECTION INTRAMUSCULAR; INTRAVENOUS EVERY 10 MIN PRN
Status: DISCONTINUED | OUTPATIENT
Start: 2020-10-23 | End: 2020-10-23 | Stop reason: HOSPADM

## 2020-10-23 RX ORDER — ACETAMINOPHEN 500 MG
1000 TABLET ORAL ONCE
Status: COMPLETED | OUTPATIENT
Start: 2020-10-23 | End: 2020-10-23

## 2020-10-23 RX ORDER — SULFAMETHOXAZOLE AND TRIMETHOPRIM 800; 160 MG/1; MG/1
1 TABLET ORAL 2 TIMES DAILY
Qty: 10 TABLET | Refills: 0 | Status: SHIPPED | OUTPATIENT
Start: 2020-10-23 | End: 2020-10-28

## 2020-10-23 RX ORDER — HYDROMORPHONE HCL 110MG/55ML
0.5 PATIENT CONTROLLED ANALGESIA SYRINGE INTRAVENOUS EVERY 5 MIN PRN
Status: DISCONTINUED | OUTPATIENT
Start: 2020-10-23 | End: 2020-10-23 | Stop reason: HOSPADM

## 2020-10-23 RX ORDER — OXYCODONE HYDROCHLORIDE AND ACETAMINOPHEN 5; 325 MG/1; MG/1
1 TABLET ORAL PRN
Status: DISCONTINUED | OUTPATIENT
Start: 2020-10-23 | End: 2020-10-23 | Stop reason: HOSPADM

## 2020-10-23 RX ORDER — PROPOFOL 10 MG/ML
INJECTION, EMULSION INTRAVENOUS PRN
Status: DISCONTINUED | OUTPATIENT
Start: 2020-10-23 | End: 2020-10-23 | Stop reason: SDUPTHER

## 2020-10-23 RX ORDER — SODIUM CHLORIDE 0.9 % (FLUSH) 0.9 %
10 SYRINGE (ML) INJECTION EVERY 12 HOURS SCHEDULED
Status: DISCONTINUED | OUTPATIENT
Start: 2020-10-23 | End: 2020-10-23 | Stop reason: HOSPADM

## 2020-10-23 RX ORDER — LIDOCAINE HYDROCHLORIDE 20 MG/ML
INJECTION, SOLUTION EPIDURAL; INFILTRATION; INTRACAUDAL; PERINEURAL PRN
Status: DISCONTINUED | OUTPATIENT
Start: 2020-10-23 | End: 2020-10-23 | Stop reason: SDUPTHER

## 2020-10-23 RX ORDER — OXYCODONE HYDROCHLORIDE AND ACETAMINOPHEN 5; 325 MG/1; MG/1
2 TABLET ORAL PRN
Status: DISCONTINUED | OUTPATIENT
Start: 2020-10-23 | End: 2020-10-23 | Stop reason: HOSPADM

## 2020-10-23 RX ORDER — MIDAZOLAM HYDROCHLORIDE 1 MG/ML
INJECTION INTRAMUSCULAR; INTRAVENOUS PRN
Status: DISCONTINUED | OUTPATIENT
Start: 2020-10-23 | End: 2020-10-23 | Stop reason: SDUPTHER

## 2020-10-23 RX ORDER — GINSENG 100 MG
CAPSULE ORAL PRN
Status: DISCONTINUED | OUTPATIENT
Start: 2020-10-23 | End: 2020-10-23 | Stop reason: ALTCHOICE

## 2020-10-23 RX ORDER — OXYCODONE HYDROCHLORIDE AND ACETAMINOPHEN 5; 325 MG/1; MG/1
1 TABLET ORAL EVERY 6 HOURS PRN
Qty: 20 TABLET | Refills: 0 | Status: SHIPPED | OUTPATIENT
Start: 2020-10-23 | End: 2020-10-30

## 2020-10-23 RX ORDER — LABETALOL HYDROCHLORIDE 5 MG/ML
5 INJECTION, SOLUTION INTRAVENOUS EVERY 10 MIN PRN
Status: DISCONTINUED | OUTPATIENT
Start: 2020-10-23 | End: 2020-10-23 | Stop reason: HOSPADM

## 2020-10-23 RX ORDER — SODIUM CHLORIDE 9 MG/ML
INJECTION, SOLUTION INTRAVENOUS CONTINUOUS
Status: DISCONTINUED | OUTPATIENT
Start: 2020-10-23 | End: 2020-10-23

## 2020-10-23 RX ORDER — ROPIVACAINE HYDROCHLORIDE 5 MG/ML
INJECTION, SOLUTION EPIDURAL; INFILTRATION; PERINEURAL PRN
Status: DISCONTINUED | OUTPATIENT
Start: 2020-10-23 | End: 2020-10-23 | Stop reason: SDUPTHER

## 2020-10-23 RX ORDER — ONDANSETRON 2 MG/ML
4 INJECTION INTRAMUSCULAR; INTRAVENOUS
Status: DISCONTINUED | OUTPATIENT
Start: 2020-10-23 | End: 2020-10-23 | Stop reason: HOSPADM

## 2020-10-23 RX ORDER — ROCURONIUM BROMIDE 10 MG/ML
INJECTION, SOLUTION INTRAVENOUS PRN
Status: DISCONTINUED | OUTPATIENT
Start: 2020-10-23 | End: 2020-10-23 | Stop reason: SDUPTHER

## 2020-10-23 RX ORDER — SODIUM CHLORIDE, SODIUM LACTATE, POTASSIUM CHLORIDE, CALCIUM CHLORIDE 600; 310; 30; 20 MG/100ML; MG/100ML; MG/100ML; MG/100ML
INJECTION, SOLUTION INTRAVENOUS CONTINUOUS
Status: DISCONTINUED | OUTPATIENT
Start: 2020-10-23 | End: 2020-10-23 | Stop reason: HOSPADM

## 2020-10-23 RX ORDER — DEXAMETHASONE SODIUM PHOSPHATE 10 MG/ML
INJECTION INTRAMUSCULAR; INTRAVENOUS PRN
Status: DISCONTINUED | OUTPATIENT
Start: 2020-10-23 | End: 2020-10-23 | Stop reason: SDUPTHER

## 2020-10-23 RX ORDER — ONDANSETRON 2 MG/ML
INJECTION INTRAMUSCULAR; INTRAVENOUS PRN
Status: DISCONTINUED | OUTPATIENT
Start: 2020-10-23 | End: 2020-10-23 | Stop reason: SDUPTHER

## 2020-10-23 RX ORDER — EPHEDRINE SULFATE/0.9% NACL/PF 50 MG/5 ML
SYRINGE (ML) INTRAVENOUS PRN
Status: DISCONTINUED | OUTPATIENT
Start: 2020-10-23 | End: 2020-10-23 | Stop reason: SDUPTHER

## 2020-10-23 RX ADMIN — LIDOCAINE HYDROCHLORIDE 100 MG: 20 INJECTION, SOLUTION EPIDURAL; INFILTRATION; INTRACAUDAL; PERINEURAL at 08:41

## 2020-10-23 RX ADMIN — Medication 5 MG: at 10:21

## 2020-10-23 RX ADMIN — SUGAMMADEX 200 MG: 100 INJECTION, SOLUTION INTRAVENOUS at 13:14

## 2020-10-23 RX ADMIN — SODIUM CHLORIDE, POTASSIUM CHLORIDE, SODIUM LACTATE AND CALCIUM CHLORIDE: 600; 310; 30; 20 INJECTION, SOLUTION INTRAVENOUS at 10:11

## 2020-10-23 RX ADMIN — ROCURONIUM BROMIDE 30 MG: 10 INJECTION, SOLUTION INTRAVENOUS at 09:28

## 2020-10-23 RX ADMIN — PHENYLEPHRINE HYDROCHLORIDE 100 MCG: 10 INJECTION INTRAVENOUS at 12:16

## 2020-10-23 RX ADMIN — ACETAMINOPHEN 1000 MG: 500 TABLET ORAL at 07:40

## 2020-10-23 RX ADMIN — FENTANYL CITRATE 50 MCG: 50 INJECTION, SOLUTION INTRAMUSCULAR; INTRAVENOUS at 12:40

## 2020-10-23 RX ADMIN — PHENYLEPHRINE HYDROCHLORIDE 100 MCG: 10 INJECTION INTRAVENOUS at 11:38

## 2020-10-23 RX ADMIN — FENTANYL CITRATE 50 MCG: 50 INJECTION, SOLUTION INTRAMUSCULAR; INTRAVENOUS at 09:41

## 2020-10-23 RX ADMIN — ONDANSETRON 4 MG: 2 INJECTION, SOLUTION INTRAMUSCULAR; INTRAVENOUS at 11:34

## 2020-10-23 RX ADMIN — Medication 5 MG: at 11:46

## 2020-10-23 RX ADMIN — PHENYLEPHRINE HYDROCHLORIDE 100 MCG: 10 INJECTION INTRAVENOUS at 11:59

## 2020-10-23 RX ADMIN — SODIUM CHLORIDE, POTASSIUM CHLORIDE, SODIUM LACTATE AND CALCIUM CHLORIDE: 600; 310; 30; 20 INJECTION, SOLUTION INTRAVENOUS at 07:34

## 2020-10-23 RX ADMIN — FENTANYL CITRATE 50 MCG: 50 INJECTION, SOLUTION INTRAMUSCULAR; INTRAVENOUS at 09:40

## 2020-10-23 RX ADMIN — PHENYLEPHRINE HYDROCHLORIDE 50 MCG: 10 INJECTION INTRAVENOUS at 09:19

## 2020-10-23 RX ADMIN — Medication 5 MG: at 10:07

## 2020-10-23 RX ADMIN — PHENYLEPHRINE HYDROCHLORIDE 100 MCG: 10 INJECTION INTRAVENOUS at 10:31

## 2020-10-23 RX ADMIN — PHENYLEPHRINE HYDROCHLORIDE 100 MCG: 10 INJECTION INTRAVENOUS at 10:21

## 2020-10-23 RX ADMIN — PHENYLEPHRINE HYDROCHLORIDE 100 MCG: 10 INJECTION INTRAVENOUS at 09:05

## 2020-10-23 RX ADMIN — PHENYLEPHRINE HYDROCHLORIDE 100 MCG: 10 INJECTION INTRAVENOUS at 10:07

## 2020-10-23 RX ADMIN — ROCURONIUM BROMIDE 10 MG: 10 INJECTION, SOLUTION INTRAVENOUS at 10:12

## 2020-10-23 RX ADMIN — FENTANYL CITRATE 50 MCG: 50 INJECTION, SOLUTION INTRAMUSCULAR; INTRAVENOUS at 11:18

## 2020-10-23 RX ADMIN — ROCURONIUM BROMIDE 20 MG: 10 INJECTION, SOLUTION INTRAVENOUS at 09:14

## 2020-10-23 RX ADMIN — ROPIVACAINE HYDROCHLORIDE 30 ML: 5 INJECTION, SOLUTION EPIDURAL; INFILTRATION; PERINEURAL at 13:15

## 2020-10-23 RX ADMIN — PROPOFOL 200 MG: 10 INJECTION, EMULSION INTRAVENOUS at 08:41

## 2020-10-23 RX ADMIN — FENTANYL CITRATE 25 MCG: 50 INJECTION, SOLUTION INTRAMUSCULAR; INTRAVENOUS at 13:58

## 2020-10-23 RX ADMIN — MIDAZOLAM 2 MG: 1 INJECTION INTRAMUSCULAR; INTRAVENOUS at 08:36

## 2020-10-23 RX ADMIN — MIDAZOLAM 2 MG: 1 INJECTION INTRAMUSCULAR; INTRAVENOUS at 09:38

## 2020-10-23 RX ADMIN — ROCURONIUM BROMIDE 20 MG: 10 INJECTION, SOLUTION INTRAVENOUS at 10:42

## 2020-10-23 RX ADMIN — ROCURONIUM BROMIDE 30 MG: 10 INJECTION, SOLUTION INTRAVENOUS at 09:36

## 2020-10-23 RX ADMIN — PHENYLEPHRINE HYDROCHLORIDE 200 MCG: 10 INJECTION INTRAVENOUS at 11:45

## 2020-10-23 RX ADMIN — DEXAMETHASONE SODIUM PHOSPHATE 10 MG: 10 INJECTION INTRAMUSCULAR; INTRAVENOUS at 09:05

## 2020-10-23 RX ADMIN — PHENYLEPHRINE HYDROCHLORIDE 100 MCG: 10 INJECTION INTRAVENOUS at 11:40

## 2020-10-23 RX ADMIN — CEFAZOLIN 2 G: 10 INJECTION, POWDER, FOR SOLUTION INTRAVENOUS at 08:51

## 2020-10-23 RX ADMIN — FENTANYL CITRATE 100 MCG: 50 INJECTION, SOLUTION INTRAMUSCULAR; INTRAVENOUS at 08:41

## 2020-10-23 RX ADMIN — Medication 500 ML: at 13:39

## 2020-10-23 RX ADMIN — FENTANYL CITRATE 25 MCG: 50 INJECTION, SOLUTION INTRAMUSCULAR; INTRAVENOUS at 14:12

## 2020-10-23 RX ADMIN — Medication 10 MG: at 09:36

## 2020-10-23 RX ADMIN — CEFAZOLIN 2000 MG: 1 INJECTION, POWDER, FOR SOLUTION INTRAMUSCULAR; INTRAVENOUS at 12:43

## 2020-10-23 RX ADMIN — PHENYLEPHRINE HYDROCHLORIDE 50 MCG: 10 INJECTION INTRAVENOUS at 09:09

## 2020-10-23 RX ADMIN — FENTANYL CITRATE 50 MCG: 50 INJECTION, SOLUTION INTRAMUSCULAR; INTRAVENOUS at 12:51

## 2020-10-23 RX ADMIN — ROCURONIUM BROMIDE 50 MG: 10 INJECTION, SOLUTION INTRAVENOUS at 08:41

## 2020-10-23 RX ADMIN — PHENYLEPHRINE HYDROCHLORIDE 50 MCG: 10 INJECTION INTRAVENOUS at 09:16

## 2020-10-23 RX ADMIN — ROCURONIUM BROMIDE 20 MG: 10 INJECTION, SOLUTION INTRAVENOUS at 11:16

## 2020-10-23 RX ADMIN — PHENYLEPHRINE HYDROCHLORIDE 100 MCG: 10 INJECTION INTRAVENOUS at 10:47

## 2020-10-23 ASSESSMENT — PULMONARY FUNCTION TESTS
PIF_VALUE: 20
PIF_VALUE: 18
PIF_VALUE: 18
PIF_VALUE: 19
PIF_VALUE: 22
PIF_VALUE: 23
PIF_VALUE: 19
PIF_VALUE: 21
PIF_VALUE: 1
PIF_VALUE: 21
PIF_VALUE: 18
PIF_VALUE: 18
PIF_VALUE: 19
PIF_VALUE: 18
PIF_VALUE: 2
PIF_VALUE: 18
PIF_VALUE: 16
PIF_VALUE: 20
PIF_VALUE: 22
PIF_VALUE: 21
PIF_VALUE: 3
PIF_VALUE: 19
PIF_VALUE: 3
PIF_VALUE: 22
PIF_VALUE: 17
PIF_VALUE: 19
PIF_VALUE: 20
PIF_VALUE: 19
PIF_VALUE: 18
PIF_VALUE: 20
PIF_VALUE: 20
PIF_VALUE: 22
PIF_VALUE: 21
PIF_VALUE: 20
PIF_VALUE: 20
PIF_VALUE: 19
PIF_VALUE: 21
PIF_VALUE: 18
PIF_VALUE: 23
PIF_VALUE: 18
PIF_VALUE: 20
PIF_VALUE: 2
PIF_VALUE: 21
PIF_VALUE: 20
PIF_VALUE: 18
PIF_VALUE: 21
PIF_VALUE: 20
PIF_VALUE: 19
PIF_VALUE: 22
PIF_VALUE: 16
PIF_VALUE: 19
PIF_VALUE: 18
PIF_VALUE: 22
PIF_VALUE: 18
PIF_VALUE: 21
PIF_VALUE: 20
PIF_VALUE: 19
PIF_VALUE: 18
PIF_VALUE: 4
PIF_VALUE: 20
PIF_VALUE: 18
PIF_VALUE: 19
PIF_VALUE: 23
PIF_VALUE: 19
PIF_VALUE: 19
PIF_VALUE: 20
PIF_VALUE: 18
PIF_VALUE: 21
PIF_VALUE: 23
PIF_VALUE: 20
PIF_VALUE: 21
PIF_VALUE: 22
PIF_VALUE: 22
PIF_VALUE: 15
PIF_VALUE: 18
PIF_VALUE: 20
PIF_VALUE: 18
PIF_VALUE: 21
PIF_VALUE: 19
PIF_VALUE: 23
PIF_VALUE: 21
PIF_VALUE: 19
PIF_VALUE: 22
PIF_VALUE: 20
PIF_VALUE: 22
PIF_VALUE: 18
PIF_VALUE: 21
PIF_VALUE: 21
PIF_VALUE: 17
PIF_VALUE: 18
PIF_VALUE: 20
PIF_VALUE: 18
PIF_VALUE: 17
PIF_VALUE: 19
PIF_VALUE: 19
PIF_VALUE: 21
PIF_VALUE: 18
PIF_VALUE: 20
PIF_VALUE: 20
PIF_VALUE: 18
PIF_VALUE: 0
PIF_VALUE: 19
PIF_VALUE: 20
PIF_VALUE: 20
PIF_VALUE: 22
PIF_VALUE: 22
PIF_VALUE: 20
PIF_VALUE: 20
PIF_VALUE: 22
PIF_VALUE: 21
PIF_VALUE: 20
PIF_VALUE: 22
PIF_VALUE: 22
PIF_VALUE: 20
PIF_VALUE: 20
PIF_VALUE: 17
PIF_VALUE: 18
PIF_VALUE: 20
PIF_VALUE: 22
PIF_VALUE: 20
PIF_VALUE: 22
PIF_VALUE: 18
PIF_VALUE: 22
PIF_VALUE: 21
PIF_VALUE: 21
PIF_VALUE: 22
PIF_VALUE: 19
PIF_VALUE: 23
PIF_VALUE: 23
PIF_VALUE: 20
PIF_VALUE: 22
PIF_VALUE: 19
PIF_VALUE: 19
PIF_VALUE: 22
PIF_VALUE: 23
PIF_VALUE: 20
PIF_VALUE: 19
PIF_VALUE: 19
PIF_VALUE: 21
PIF_VALUE: 21
PIF_VALUE: 22
PIF_VALUE: 22
PIF_VALUE: 20
PIF_VALUE: 18
PIF_VALUE: 20
PIF_VALUE: 22
PIF_VALUE: 19
PIF_VALUE: 23
PIF_VALUE: 18
PIF_VALUE: 19
PIF_VALUE: 21
PIF_VALUE: 20
PIF_VALUE: 22
PIF_VALUE: 20
PIF_VALUE: 18
PIF_VALUE: 23
PIF_VALUE: 18
PIF_VALUE: 20
PIF_VALUE: 18
PIF_VALUE: 19
PIF_VALUE: 18
PIF_VALUE: 20
PIF_VALUE: 21
PIF_VALUE: 21
PIF_VALUE: 8
PIF_VALUE: 21
PIF_VALUE: 18
PIF_VALUE: 23
PIF_VALUE: 18
PIF_VALUE: 19
PIF_VALUE: 20
PIF_VALUE: 21
PIF_VALUE: 23
PIF_VALUE: 21
PIF_VALUE: 20
PIF_VALUE: 21
PIF_VALUE: 1
PIF_VALUE: 1
PIF_VALUE: 18
PIF_VALUE: 21
PIF_VALUE: 22
PIF_VALUE: 19
PIF_VALUE: 22
PIF_VALUE: 18
PIF_VALUE: 21
PIF_VALUE: 19
PIF_VALUE: 18
PIF_VALUE: 19
PIF_VALUE: 18
PIF_VALUE: 21
PIF_VALUE: 21
PIF_VALUE: 18
PIF_VALUE: 18
PIF_VALUE: 20
PIF_VALUE: 19
PIF_VALUE: 21
PIF_VALUE: 20
PIF_VALUE: 21
PIF_VALUE: 22
PIF_VALUE: 21
PIF_VALUE: 20
PIF_VALUE: 21
PIF_VALUE: 21
PIF_VALUE: 18
PIF_VALUE: 18
PIF_VALUE: 20
PIF_VALUE: 19
PIF_VALUE: 1
PIF_VALUE: 18
PIF_VALUE: 22
PIF_VALUE: 20
PIF_VALUE: 22
PIF_VALUE: 1
PIF_VALUE: 20
PIF_VALUE: 20
PIF_VALUE: 18
PIF_VALUE: 5
PIF_VALUE: 23
PIF_VALUE: 21
PIF_VALUE: 19
PIF_VALUE: 18
PIF_VALUE: 21
PIF_VALUE: 17
PIF_VALUE: 22
PIF_VALUE: 19
PIF_VALUE: 19
PIF_VALUE: 20
PIF_VALUE: 15
PIF_VALUE: 21
PIF_VALUE: 20
PIF_VALUE: 22
PIF_VALUE: 1
PIF_VALUE: 20
PIF_VALUE: 22
PIF_VALUE: 20
PIF_VALUE: 20
PIF_VALUE: 23
PIF_VALUE: 20
PIF_VALUE: 21
PIF_VALUE: 21
PIF_VALUE: 22
PIF_VALUE: 20
PIF_VALUE: 18
PIF_VALUE: 12
PIF_VALUE: 22
PIF_VALUE: 18
PIF_VALUE: 20
PIF_VALUE: 18
PIF_VALUE: 21
PIF_VALUE: 20
PIF_VALUE: 18
PIF_VALUE: 17
PIF_VALUE: 19
PIF_VALUE: 2
PIF_VALUE: 18
PIF_VALUE: 23
PIF_VALUE: 22
PIF_VALUE: 4
PIF_VALUE: 18
PIF_VALUE: 20
PIF_VALUE: 21
PIF_VALUE: 21
PIF_VALUE: 22
PIF_VALUE: 22
PIF_VALUE: 19
PIF_VALUE: 21
PIF_VALUE: 22
PIF_VALUE: 23
PIF_VALUE: 23
PIF_VALUE: 21
PIF_VALUE: 15
PIF_VALUE: 18
PIF_VALUE: 19
PIF_VALUE: 22
PIF_VALUE: 17
PIF_VALUE: 21
PIF_VALUE: 21

## 2020-10-23 ASSESSMENT — PAIN DESCRIPTION - ORIENTATION: ORIENTATION: LEFT

## 2020-10-23 ASSESSMENT — PAIN SCALES - GENERAL
PAINLEVEL_OUTOF10: 0
PAINLEVEL_OUTOF10: 3
PAINLEVEL_OUTOF10: 3
PAINLEVEL_OUTOF10: 5
PAINLEVEL_OUTOF10: 3
PAINLEVEL_OUTOF10: 4
PAINLEVEL_OUTOF10: 5
PAINLEVEL_OUTOF10: 3
PAINLEVEL_OUTOF10: 0
PAINLEVEL_OUTOF10: 3
PAINLEVEL_OUTOF10: 0

## 2020-10-23 ASSESSMENT — PAIN - FUNCTIONAL ASSESSMENT: PAIN_FUNCTIONAL_ASSESSMENT: 0-10

## 2020-10-23 ASSESSMENT — PAIN DESCRIPTION - DESCRIPTORS
DESCRIPTORS: ACHING
DESCRIPTORS: ACHING;DULL

## 2020-10-23 ASSESSMENT — PAIN DESCRIPTION - LOCATION: LOCATION: FOOT

## 2020-10-23 ASSESSMENT — PAIN DESCRIPTION - PAIN TYPE: TYPE: ACUTE PAIN;SURGICAL PAIN

## 2020-10-23 NOTE — H&P
History and Physical Update    Pt Name: Forrest Riojas  MRN: [id number]  YOB: 1975  Date of evaluation: 10/23/2020      [x] I have reviewed the orthopedic surgery progress note found in Epic by Dr. Kirby Kenyon from 10/21/2020 which meets the criteria for an Interval History and Physical note. [x] I have examined  Forrest Riojas a 39 y.o., female who is scheduled for a left subtalar fusion, possible calcaneal osteotomy with SETH by Dr. Kirby Kenyon due to left ankle impingement, subtalar OA. The patient denies health changes since her appointment with Dr. Kirby Kenyon on 10/21/2020. Pt denies fever, chills, productive cough, SOB, chest pain, open sores, rashes, and wounds. Pt denies history of diabetes. Motrin was last taken on 10/14/2020. Vital signs: /81   Pulse 98   Temp 96.1 °F (35.6 °C) (Temporal)   Resp 20   Ht 5' 7\" (1.702 m)   Wt 220 lb (99.8 kg)   SpO2 100%   BMI 34.46 kg/m²      Allergies:  Codeine    Past medical history, surgical history, social history, and family history were reviewed and updated in EPIC as indicated. Medications:    Prior to Admission medications    Medication Sig Start Date End Date Taking? Authorizing Provider   citalopram (CELEXA) 40 MG tablet Take 40 mg by mouth 4/18/16  Yes Historical Provider, MD   traMADol (ULTRAM) 50 MG tablet Take 50 mg by mouth. . 11/28/16  Yes Historical Provider, MD   gabapentin (NEURONTIN) 300 MG capsule 3 tabs q HS 3/9/18  Yes Historical Provider, MD   hydrOXYzine (VISTARIL) 25 MG capsule Take 25 mg by mouth 2/27/18  Yes Historical Provider, MD   ibuprofen (ADVIL;MOTRIN) 800 MG tablet Take 800 mg by mouth 5/27/16   Historical Provider, MD   orphenadrine (NORFLEX) 100 MG extended release tablet Take 100 mg by mouth 9/26/17   Historical Provider, MD       This is a 39 y.o. female who is pleasant, cooperative, alert and oriented x 3, in no acute distress. Obese. Anxious.      Heart: Regular rate and rhythm without murmur, gallop, or rub. Lungs: Normal respiratory effort, unlabored, and clear to auscultation without wheezes or rales bilaterally. Abdomen: Soft, non-tender, non-distended with active bowel sounds. Pedal pulses: 2+ bilaterally. Labs:  No results for input(s): HGB, HCT, WBC, MCV, PLT, NA, K, CL, CO2, BUN, CREATININE, GLUCOSE, INR, PROTIME, APTT, AST, ALT, LABALBU, HCG in the last 720 hours. Recent Labs     10/19/20  [facility] Not Detected                     Diana Montgomery  THERON, FEIP-BC  Electronically signed 10/23/2020 at 7:52 AM      Delbert Mooney MD    Physician    Specialty:  Orthopedic Surgery    Progress Notes    Signed    Encounter Date:  10/21/2020          Related encounter: Office Visit from 10/21/2020 in [address] [place] [address] and Sports Medicine          Signed        Expand All Collapse All     Show:Clear all  [x]Manual[x]Template[x]Copied    Added by:  [x]Wei Canseco MD    []Piero for details    [address] [place] [address]  Dept: [phone]     Ambulatory Orthopedic Consult        CHIEF COMPLAINT:         Chief Complaint   Patient presents with    Foot Pain       Left Foot         HISTORY OF PRESENT ILLNESS:       The patient is a 39 y.o. female who is being seen for consultation and evaluation of pain at the left lateral hindfoot, which began due to an injury on 12/7/2016 secondary to a fall down stairs while at work (treated nonoperatively). The pain is described mainly with mechanical terms (dull/sharp/throbbing). The pain is worse with activity and better with rest. The patient reports a progressive course. The patient has tried:   [place]    [x]? rest/activity modification          [x]? NSAIDs                     []?  opiates          []? orthotics        []? change in shoes   []?  home exercises           []? physical therapy        [x]? CAM boot     []? brace:    [x]?  injection:       []?  surgery:       She was previously seen by Dr. Indira Porter for this issue. INTERVAL HISTORY 8/21/2020:  She is seen again today in the office for follow up of a CT scan. Since being seen last, the patient is doing worse. She is ambulating today using regular shoes without a brace or assistive device. The location and quality of the pain have not significantly changed since the last visit. She is here today with her brother to discuss surgery. INTERVAL HISTORY 10/21/2020:  She is seen again today in the office for follow up of a previous issue (as above). Since being seen last, she reports the problem has not significantly improved. At today's visit, she is using no brace or assistive device. The location and quality of the pain have not significantly changed since the last visit. She is here today for a preoperative visit, and wishes to proceed with surgery as planned. She denies any other significant changes in medical history. REVIEW OF SYSTEMS:  Constitutional: Negative for fever. HENT: Negative for tinnitus. Eyes: Negative for pain. Respiratory: Negative for shortness of breath. Cardiovascular: Negative for chest pain. Gastrointestinal: Negative for abdominal pain. Genitourinary: Negative for dysuria. Skin: Negative for rash. Neurological: Negative for headaches. Hematological: Does not bruise/bleed easily. Musculoskeletal: See HPI for pertinent positives     Past Medical History:    She   Past Medical History in prose (no negatives)    has a past medical history of Anxiety, Depression, Fibromyalgia, and Wears contact lenses. Past Surgical History:    She  has a past surgical history that includes Breast surgery (Right).       Current Medications:   Current Medication     Current Outpatient Medications:     citalopram (CELEXA) 40 MG tablet, Take 40 mg by mouth, Disp: , Rfl:     ibuprofen (ADVIL;MOTRIN) 800 MG tablet, Take 800 mg by mouth, Disp: , Rfl:     traMADol (ULTRAM) 50 MG tablet, Take 50 mg by mouth. ., Disp: , Rfl:     gabapentin (NEURONTIN) 300 MG capsule, 3 tabs q HS, Disp: , Rfl:     hydrOXYzine (VISTARIL) 25 MG capsule, Take 25 mg by mouth, Disp: , Rfl:     orphenadrine (NORFLEX) 100 MG extended release tablet, Take 100 mg by mouth, Disp: , Rfl:          Allergies:    Codeine     Family History:  family history includes Hypertension in her mother. Social History:   Social History            Occupational History    Not on file   Tobacco Use    Smoking status: Never Smoker    Smokeless tobacco: Never Used   Substance and Sexual Activity    Alcohol use: Yes       Alcohol/week: 3.0 standard drinks       Types: 3 Glasses of wine per week    Drug use: No    Sexual activity: Not on file      Occupation: Full-time medical billing. [employer], and will not accept blood products. She reports that her   in 2019 from stage IV colon cancer. OBJECTIVE:  Temp 97.2 °F (36.2 °C)   Resp 16   Ht 5' 7\" (1.702 m)   Wt 220 lb (99.8 kg)   BMI 34.46 kg/m²    Psych: alert and oriented to person, time, and place  Cardio:  well perfused extremities  Resp:  normal respiratory effort  Skin:  no cyanosis  Hem/lymph:  no lymphedema  Neuro:  sensation to light touch grossly intact throughout all nerve distributions in the foot   Musculoskeletal:    RLE:  Alignment:  Heel neutral   Vascular: Toes warm and well perfused, compartments soft/compressible. No significant swelling of foot. Skin: Intact without rash/lesions/AV malformations. Strength: Able to fire/perform the following with appropriate strength:    [x]? Tib Ant:                  [x]? Gastroc-Soleus:                    [x]? Inversion:              [x]? Eversion:                   [x]?  FHL:      [x]? EHL:       Motion:  Normal for the following joints:    [x]? Ankle:                    [x]? Subtalar:       [x]?   1st MTP:                []?  1st TMT:                        Tenderness to Palpation:    Tenderness to palpation: None        LLE:  Alignment:  Heel valgus, asymmetric to contralateral  Vascular: Toes warm and well perfused, compartments soft/compressible. No significant swelling of foot. Skin: Intact without rash/lesions/AV malformations. Strength: Able to fire/perform the following with appropriate strength:    [x]? Tib Ant:                  [x]? Gastroc-Soleus:                    [x]? Inversion:              [x]? Eversion:                   [x]?  FHL:      [x]? EHL:       Motion:  Normal for the following joints:    [x]? Ankle:                    []?  Subtalar: Decreased            [x]? 1st MTP:                []?  1st TMT:                        Tenderness to Palpation:    Tenderness to palpation: Diffusely throughout the lateral hindfoot and across the anterior ankle joint line        RADIOLOGY:   10/21/2020 Prior images reviewed for reference. CT images and radiology report reviewed, as below:    CT ankle:         1. Calcaneus/hindfoot valgus. Moderate degenerative changes of the subtalar    joint. Diffuse osteopenia. 2. Os trigonum. 3. Mild soft tissue swelling and edema of the medial left great toe. Mild    edema in the subcutaneous fat about the ankle and foot. 4. No acute osseous abnormality. CT foot:         1. Calcaneus/hindfoot valgus. Moderate degenerative changes at the subtalar    joint. Diffuse osteopenia. 2. Os trigonum. 3. Mild edema and soft tissue swelling at the medial left great toe and about    the ankle and foot. 4. No acute osseous abnormality.                      FINDINGS:  Three weightbearing views (AP, Mortise, and Lateral) of the left ankle and three weightbearing views (AP, Oblique, Lateral) of the left foot and 2 weightbearing views (axial and lateral) of the bilateral calcaneus were obtained in the office today and reviewed, revealing no acute fracture, dislocation, or radioopaque foreign body/tumor. Degenerative changes of the left subtalar joint with joint narrowing, sclerosis, and osteophytes, along with loss of calcaneal length and height, as well as evidence of anterior ankle impingement     IMPRESSION:  No acute fracture/dislocation. Degenerative changes as above. Electronically signed by Fran Schwartz MD        ASSESSMENT AND PLAN:  Misael Elizabeth was seen today for Foot Pain (Left Foot)  The primary encounter diagnosis was Arthritis of subtalar joint. Diagnoses of Impingement syndrome of left ankle and Equinus contracture of ankle were also pertinent to this visit. Body mass index is 34.46 kg/m². She has left posttraumatic subtalar arthritis, with a history of a calcaneus fracture (treated nonoperatively), along with left anterior ankle impingement; this occurred secondary to an injury on 12/7/2016 at work. Notably, she has the past medical history as above, including but not limited to the following:  Psoriasis, fibromyalgia, and anxiety/depression. I had another discussion today with the patient about the likely diagnosis and its natural history, physical exam and imaging findings, as well as treatment options in detail. We discussed rest/activity modification, swelling control, NSAIDs/Acetaminophen/topical anesthetics, orthotics/shoewear modification, bracing/immobilization, injections, and physical therapy. Surgically, we have discussed a left subtalar fusion with possible calcaneal osteotomy (possible medial slide by about 10 mm plus about 7 8 mm of height, possibly depending on soft tissue), with SETH, and bone grafting. We did discuss bone grafting at length at today's visit, and she is okay with allograft, local autograft, as well as the possibility of using bone substitute (we have discussed options such as calcium triphosphate, platelet derived growth factor, and others), and she is okay with the use of this.   She does wish to proceed with surgery as planned. The patient wishes to proceed with the recommendations as above. Orders/referrals were placed as below at today's visit. We spoke about the risks/benefits/alternatives to a surgical intervention. They understand that the risks of surgery may include but are not limited to pain, infection, bleeding, blood clot, damage to soft tissue/vessel/nerve, future surgery, scarring/stiffness, decreased strength/weakness, cosmetic deformity, neuroma/neuritis/phantom pains, delayed soft tissue/bone healing, nonunion, malunion, failure of hardware/fixation/surgery, iatrogenic fracture/dislocation, damage to bone/joint(s), worsening of condition, recurrence, limb length discrepancy, avascular necrosis of bone, neurovascular compromise/compartment syndrome, tourniquet complications, failure of surgery, dissatisfaction with outcome, loss of limb, stroke, heart attack, pulmonary embolus, mental status change, anesthesia risks/reaction, and even death. They expressed verbal understanding of the risks and wish to proceed with surgical intervention. All questions were answered. No guarantees were made or implied. Informed consent was obtained. Notably, she does not consent to blood transfusion, and this was removed from her surgical consent form. The patient was counseled about the risks of andrea Covid-19 during the perioperative period and any recovery window from their procedure. The patient was made aware that andrea Covid-19 may worsen their prognosis for recovering from their procedure and lend to a higher morbidity and/or mortality risk. All material risks, benefits, and reasonable alternatives including postponing the procedure were discussed. The patient does wish to proceed with their procedure at this time. We also had a discussion about the risk of blood clot and thromboembolic events.  The patient understands that there is an increased risk with surgery/immobilization, and understands nothing will completely eliminate the risk of DVT/PE's, and that any prophylactic medication does not substitute for early mobilization. Given her risk profile, I have recommended the following strategy to decrease the risk of blood clots, and the patient agrees and wishes to proceed:  Aspirin 325 mg PO q Day. All questions were answered and the patient agrees with the above plan. The patient will return to clinic postoperatively. No follow-ups on file. Encounter Medications    No orders of the defined types were placed in this encounter. No orders of the defined types were placed in this encounter. Alaina Vasquez MD  Orthopedic Surgery, Foot and Ankle           Please excuse any typos/errors, as this note was created with the assistance of voice recognition software. While intending to generate a document that actually reflects the content of the visit, the document can still have some errors including those of syntax and sound-a-like substitutions which may escape proof reading. In such instances, actual meaning can be extrapolated by context.

## 2020-10-23 NOTE — H&P
I spoke to the patient in the preoperative area, and the operative site was identified, verified and marked. The procedure was verified. I reviewed the risks, benefits, and alternatives to the procedure. No guarantees were made. I have also reviewed the history and physical. There are no significant changes in medical history. All questions were answered and the patient wishes to proceed as planned.     Electronically signed by Radha Kelly MD

## 2020-10-23 NOTE — ANESTHESIA PROCEDURE NOTES
Peripheral Block    Patient location during procedure: OR  Start time: 10/23/2020 1:10 PM  End time: 10/23/2020 1:20 PM  Staffing  Anesthesiologist: Elias Ferrell DO  Performed: anesthesiologist   Preanesthetic Checklist  Completed: patient identified, site marked, surgical consent, pre-op evaluation, timeout performed, IV checked, risks and benefits discussed, monitors and equipment checked, anesthesia consent given, oxygen available and patient being monitored  Peripheral Block  Patient position: right lateral decubitus  Prep: ChloraPrep  Patient monitoring: cardiac monitor, continuous pulse ox, frequent blood pressure checks, IV access and continuous capnometry  Block type: Sciatic  Laterality: left  Injection technique: catheter  Procedures: ultrasound guided  Local infiltration: lidocaine  Infiltration strength: 1 %  Dose: 3 mL  Popliteal  Provider prep: mask and sterile gloves  Local infiltration: lidocaine  Needle  Needle type: Tuohy   Needle gauge: 18 G  Needle length: 10 cm  Needle localization: ultrasound guidance  Catheter type: open end  Catheter size: 20 G  Assessment  Injection assessment: negative aspiration for heme, no paresthesia on injection and local visualized surrounding nerve on ultrasound  Paresthesia pain: none  Slow fractionated injection: yes  Hemodynamics: stable  Additional Notes  Left popliteal PNC  30 ml 0.5% ropivacaine         Reason for block: post-op pain management and at surgeon's request

## 2020-10-23 NOTE — BRIEF OP NOTE
Brief Postoperative Note      Patient: Lester Alvarez  YOB: 1975  MRN: [id number]    Date of Procedure: 10/23/2020    Preoperative Diagnosis:   1. 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 Left ankle anterior impingement  3. Left ankle equinus contracture  4. Fibromyalgia  5. Body mass index is 34.46 kg/m².     Postoperative Diagnosis:   1. Same      Procedures Performed:  (10/23/2020)  1. Left foot subtalar fusion   2. Left calcaneal osteotomy  3. Left percutaneous tendoachilles lengthening, performed through separate incision        Surgeon(s):  Jenaro Clark MD    Assistant:  Resident: Dorian Mercer DO    Anesthesia: General    Estimated Blood Loss (mL): 200 mL crystalloid    Tourniquet time: 559 minutes    Complications: None    Specimens:   * No specimens in log *    Implants:  Implant Name Type Inv.  Item Serial No.  Lot No. LRB No. Used Action   DYLAN-PUTTY SUBST CANC [employer]OMATRIX [facility] - [id number] Bone/Graft/Tissue/Human/Synth DYLAN-PUTTY SUBST CANC [employer]OM[employer]RI[employer] [facility] [id number] [facility]  Left 1 Implanted   DYLAN-GRAFT BONE AUGMENT 3ML INJ Bone/Graft/Tissue/Human/Synth DYLAN-GRAFT BONE AUGMENT 3ML INJ  [employer] [facility] [id number] Left 1 Implanted   SCREW RACHEAL ASNIS III 5.0X70 NS Screw/Plate/Nail/Eren SCREW RACHEAL ASNIS III 5.0X70 NS  MARY JANE: ORTHOPAEDICS  Left 2 Implanted   SCREW RACHEAL PTHRD ASNIS III 4.0X60MM Screw/Plate/Nail/Eren SCREW RACHEAL PTHRD ASNIS III 4.0X60MM  MARY JANE: ORTHOPAEDICS  Left 2 Implanted         Drains: * No LDAs found *    Findings: Left subtalar osteoarthritis    Electronically signed by Dorian Mercer DO, DO on 10/23/2020 at 2:35 PM

## 2020-10-23 NOTE — ANESTHESIA PRE PROCEDURE
Department of Anesthesiology  Preprocedure Note       Name:  Sunday Kathleen   Age:  39 y.o.  :  1975                                          MRN:  [id number]         Date:  10/23/2020      Surgeon: Pablo Gonzalez):  Teresita Hardy MD    Procedure: Procedure(s):  LEFT SUBTALAR FUSION, POSSIBLE CALCANEAL OSTEOTOMY,  SETH- MARY JANE    Medications prior to admission:   Prior to Admission medications    Medication Sig Start Date End Date Taking? Authorizing Provider   citalopram (CELEXA) 40 MG tablet Take 40 mg by mouth 16  Yes Historical Provider, MD   traMADol (ULTRAM) 50 MG tablet Take 50 mg by mouth. . 16  Yes Historical Provider, MD   gabapentin (NEURONTIN) 300 MG capsule 3 tabs q HS 3/9/18  Yes Historical Provider, MD   hydrOXYzine (VISTARIL) 25 MG capsule Take 25 mg by mouth 18  Yes Historical Provider, MD   ibuprofen (ADVIL;MOTRIN) 800 MG tablet Take 800 mg by mouth 16   Historical Provider, MD   orphenadrine (NORFLEX) 100 MG extended release tablet Take 100 mg by mouth 17   Historical Provider, MD       Current medications:    Current Facility-Administered Medications   Medication Dose Route Frequency Provider Last Rate Last Dose    lactated ringers infusion   Intravenous Continuous Judith Moon  mL/hr at 10/23/20 0734      sodium chloride flush 0.9 % injection 10 mL  10 mL Intravenous 2 times per day Judith Moon MD        sodium chloride flush 0.9 % injection 10 mL  10 mL Intravenous PRN Judith Moon MD        lidocaine PF 1 % injection 1 mL  1 mL Intradermal Once PRN Judith Moon MD        ceFAZolin (ANCEF) 2 g in dextrose 5 % 50 mL IVPB  2 g Intravenous Once Teresita Hardy MD           Allergies: Allergies   Allergen Reactions    Codeine Nausea Only       Problem List:  There is no problem list on file for this patient.       Past Medical History:        Diagnosis Date    Anxiety     Depression     Fibromyalgia     Wears contact lenses        Past Surgical History:        Procedure Laterality Date    BREAST SURGERY Right     Marker        Social History:    Social History     Tobacco Use    Smoking status: Never Smoker    Smokeless tobacco: Never Used   Substance Use Topics    Alcohol use: Yes     Alcohol/week: 3.0 standard drinks     Types: 3 Glasses of wine per week                                Counseling given: Not Answered      Vital Signs (Current):   Vitals:    10/23/20 0721 10/23/20 0736 10/23/20 0745   BP:  132/81    Pulse:  106 98   Resp:  20    Temp:  96.1 °F (35.6 °C)    TempSrc:  Temporal    SpO2:  100%    Weight: 220 lb (99.8 kg)     Height: 5' 7\" (1.702 m)                                                BP Readings from Last 3 Encounters:   10/23/20 132/81   08/21/20 131/88   07/01/20 (!) 130/93       NPO Status: Time of last liquid consumption: 2100                        Time of last solid consumption: 2100                        Date of last liquid consumption: 10/22/20                        Date of last solid food consumption: 10/22/20    BMI:   Wt Readings from Last 3 Encounters:   10/23/20 220 lb (99.8 kg)   10/21/20 220 lb (99.8 kg)   10/09/20 220 lb (99.8 kg)     Body mass index is 34.46 kg/m². CBC: No results found for: WBC, RBC, HGB, HCT, MCV, RDW, PLT    CMP: No results found for: NA, K, CL, CO2, BUN, CREATININE, GFRAA, AGRATIO, LABGLOM, GLUCOSE, PROT, CALCIUM, BILITOT, ALKPHOS, AST, ALT    POC Tests: No results for input(s): POCGLU, POCNA, POCK, POCCL, POCBUN, POCHEMO, POCHCT in the last 72 hours.     Coags: No results found for: PROTIME, INR, APTT    HCG (If Applicable): No results found for: PREGTESTUR, PREGSERUM, HCG, HCGQUANT     ABGs: No results found for: PHART, PO2ART, IEW0DYG, PTX8OBZ, BEART, C4EMXGLI     Type & Screen (If Applicable):  No results found for: LABABO, LABRH    Drug/Infectious Status (If Applicable):  No results found for: HIV, HEPCAB    COVID-19 Screening (If Applicable):   Lab Results   Component Value

## 2020-10-24 ENCOUNTER — HOSPITAL ENCOUNTER (OUTPATIENT)
Age: 45
Setting detail: OBSERVATION
Discharge: HOME | End: 2020-10-25
Attending: EMERGENCY MEDICINE | Admitting: ORTHOPAEDIC SURGERY
Payer: COMMERCIAL

## 2020-10-24 ENCOUNTER — CLINICAL DOCUMENTATION (OUTPATIENT)
Dept: ORTHOPEDIC SURGERY | Age: 45
End: 2020-10-24

## 2020-10-24 LAB
ABSOLUTE EOS #: 0.04 K/UL (ref 0–0.44)
ABSOLUTE IMMATURE GRANULOCYTE: 0.05 K/UL (ref 0–0.3)
ABSOLUTE LYMPH #: 1.32 K/UL (ref 1.1–3.7)
ABSOLUTE MONO #: 0.99 K/UL (ref 0.1–1.2)
ANION GAP SERPL CALCULATED.3IONS-SCNC: 14 MMOL/L (ref 9–17)
BASOPHILS # BLD: 0 % (ref 0–2)
BASOPHILS ABSOLUTE: 0.04 K/UL (ref 0–0.2)
BUN BLDV-MCNC: 9 MG/DL (ref 6–20)
BUN/CREAT BLD: 9 (ref 9–20)
CALCIUM SERPL-MCNC: 8.5 MG/DL (ref 8.6–10.4)
CHLORIDE BLD-SCNC: 103 MMOL/L (ref 98–107)
CO2: 22 MMOL/L (ref 20–31)
CREAT SERPL-MCNC: 1.02 MG/DL (ref 0.5–0.9)
DIFFERENTIAL TYPE: ABNORMAL
EOSINOPHILS RELATIVE PERCENT: 0 % (ref 1–4)
GFR AFRICAN AMERICAN: >60 ML/MIN
GFR NON-AFRICAN AMERICAN: 59 ML/MIN
GFR SERPL CREATININE-BSD FRML MDRD: ABNORMAL ML/MIN/{1.73_M2}
GFR SERPL CREATININE-BSD FRML MDRD: ABNORMAL ML/MIN/{1.73_M2}
GLUCOSE BLD-MCNC: 117 MG/DL (ref 70–99)
HCT VFR BLD CALC: 39.1 % (ref 36.3–47.1)
HEMOGLOBIN: 12.6 G/DL (ref 11.9–15.1)
IMMATURE GRANULOCYTES: 1 %
LYMPHOCYTES # BLD: 13 % (ref 24–43)
MCH RBC QN AUTO: 31.1 PG (ref 25.2–33.5)
MCHC RBC AUTO-ENTMCNC: 32.2 G/DL (ref 28.4–34.8)
MCV RBC AUTO: 96.5 FL (ref 82.6–102.9)
MONOCYTES # BLD: 9 % (ref 3–12)
NRBC AUTOMATED: 0 PER 100 WBC
PDW BLD-RTO: 12.4 % (ref 11.8–14.4)
PLATELET # BLD: 227 K/UL (ref 138–453)
PLATELET ESTIMATE: ABNORMAL
PMV BLD AUTO: 9.3 FL (ref 8.1–13.5)
POTASSIUM SERPL-SCNC: 3.7 MMOL/L (ref 3.7–5.3)
RBC # BLD: 4.05 M/UL (ref 3.95–5.11)
RBC # BLD: ABNORMAL 10*6/UL
SEG NEUTROPHILS: 77 % (ref 36–65)
SEGMENTED NEUTROPHILS ABSOLUTE COUNT: 8.15 K/UL (ref 1.5–8.1)
SODIUM BLD-SCNC: 139 MMOL/L (ref 135–144)
WBC # BLD: 10.6 K/UL (ref 3.5–11.3)
WBC # BLD: ABNORMAL 10*3/UL

## 2020-10-24 PROCEDURE — 96375 TX/PRO/DX INJ NEW DRUG ADDON: CPT

## 2020-10-24 PROCEDURE — 96376 TX/PRO/DX INJ SAME DRUG ADON: CPT

## 2020-10-24 PROCEDURE — 6360000002 HC RX W HCPCS: Performed by: EMERGENCY MEDICINE

## 2020-10-24 PROCEDURE — 96374 THER/PROPH/DIAG INJ IV PUSH: CPT

## 2020-10-24 PROCEDURE — 80048 BASIC METABOLIC PNL TOTAL CA: CPT

## 2020-10-24 PROCEDURE — 99284 EMERGENCY DEPT VISIT MOD MDM: CPT

## 2020-10-24 PROCEDURE — 85025 COMPLETE CBC W/AUTO DIFF WBC: CPT

## 2020-10-24 PROCEDURE — 2580000003 HC RX 258: Performed by: EMERGENCY MEDICINE

## 2020-10-24 PROCEDURE — 6370000000 HC RX 637 (ALT 250 FOR IP): Performed by: EMERGENCY MEDICINE

## 2020-10-24 RX ORDER — SODIUM CHLORIDE 9 MG/ML
INJECTION, SOLUTION INTRAVENOUS CONTINUOUS
Status: DISCONTINUED | OUTPATIENT
Start: 2020-10-24 | End: 2020-10-25 | Stop reason: HOSPADM

## 2020-10-24 RX ORDER — HYDROMORPHONE HYDROCHLORIDE 1 MG/ML
1 INJECTION, SOLUTION INTRAMUSCULAR; INTRAVENOUS; SUBCUTANEOUS ONCE
Status: COMPLETED | OUTPATIENT
Start: 2020-10-24 | End: 2020-10-24

## 2020-10-24 RX ORDER — ONDANSETRON 2 MG/ML
8 INJECTION INTRAMUSCULAR; INTRAVENOUS ONCE
Status: COMPLETED | OUTPATIENT
Start: 2020-10-24 | End: 2020-10-24

## 2020-10-24 RX ORDER — ACETAMINOPHEN 500 MG
1000 TABLET ORAL ONCE
Status: COMPLETED | OUTPATIENT
Start: 2020-10-24 | End: 2020-10-24

## 2020-10-24 RX ORDER — KETOROLAC TROMETHAMINE 30 MG/ML
30 INJECTION, SOLUTION INTRAMUSCULAR; INTRAVENOUS ONCE
Status: COMPLETED | OUTPATIENT
Start: 2020-10-24 | End: 2020-10-24

## 2020-10-24 RX ADMIN — ACETAMINOPHEN 1000 MG: 500 TABLET ORAL at 21:10

## 2020-10-24 RX ADMIN — HYDROMORPHONE HYDROCHLORIDE 1 MG: 1 INJECTION, SOLUTION INTRAMUSCULAR; INTRAVENOUS; SUBCUTANEOUS at 21:10

## 2020-10-24 RX ADMIN — ONDANSETRON 8 MG: 2 INJECTION INTRAMUSCULAR; INTRAVENOUS at 21:10

## 2020-10-24 RX ADMIN — SODIUM CHLORIDE: 9 INJECTION, SOLUTION INTRAVENOUS at 22:42

## 2020-10-24 RX ADMIN — HYDROMORPHONE HYDROCHLORIDE 1 MG: 1 INJECTION, SOLUTION INTRAMUSCULAR; INTRAVENOUS; SUBCUTANEOUS at 22:42

## 2020-10-24 RX ADMIN — KETOROLAC TROMETHAMINE 30 MG: 30 INJECTION, SOLUTION INTRAMUSCULAR at 22:42

## 2020-10-24 RX ADMIN — SODIUM CHLORIDE: 9 INJECTION, SOLUTION INTRAVENOUS at 22:45

## 2020-10-24 RX ADMIN — SODIUM CHLORIDE: 9 INJECTION, SOLUTION INTRAVENOUS at 21:10

## 2020-10-24 ASSESSMENT — PAIN SCALES - GENERAL
PAINLEVEL_OUTOF10: 9
PAINLEVEL_OUTOF10: 10

## 2020-10-24 NOTE — PROGRESS NOTES
I called the patient at home for routine follow up and discussed how she was doing. I reinforced the relevant postoperative restrictions/precautions, discussed the medications I prescribed, and answered all questions. She reports pain but otherwise no complaints. We discussed pain management strategies.

## 2020-10-25 VITALS
HEIGHT: 67 IN | WEIGHT: 237 LBS | BODY MASS INDEX: 37.2 KG/M2 | RESPIRATION RATE: 16 BRPM | SYSTOLIC BLOOD PRESSURE: 127 MMHG | DIASTOLIC BLOOD PRESSURE: 82 MMHG | OXYGEN SATURATION: 96 % | HEART RATE: 97 BPM | TEMPERATURE: 98.4 F

## 2020-10-25 PROBLEM — G89.18 POST-OP PAIN: Status: ACTIVE | Noted: 2020-10-25

## 2020-10-25 PROCEDURE — 6360000002 HC RX W HCPCS: Performed by: ORTHOPAEDIC SURGERY

## 2020-10-25 PROCEDURE — 96376 TX/PRO/DX INJ SAME DRUG ADON: CPT

## 2020-10-25 PROCEDURE — 2500000003 HC RX 250 WO HCPCS: Performed by: ORTHOPAEDIC SURGERY

## 2020-10-25 PROCEDURE — 96375 TX/PRO/DX INJ NEW DRUG ADDON: CPT

## 2020-10-25 PROCEDURE — G0378 HOSPITAL OBSERVATION PER HR: HCPCS

## 2020-10-25 PROCEDURE — 99024 POSTOP FOLLOW-UP VISIT: CPT | Performed by: ORTHOPAEDIC SURGERY

## 2020-10-25 PROCEDURE — 2580000003 HC RX 258: Performed by: EMERGENCY MEDICINE

## 2020-10-25 RX ORDER — ONDANSETRON 2 MG/ML
4 INJECTION INTRAMUSCULAR; INTRAVENOUS EVERY 4 HOURS PRN
Status: DISCONTINUED | OUTPATIENT
Start: 2020-10-25 | End: 2020-10-25 | Stop reason: HOSPADM

## 2020-10-25 RX ORDER — OXYCODONE HYDROCHLORIDE 5 MG/1
5 TABLET ORAL EVERY 4 HOURS PRN
Qty: 40 TABLET | Refills: 0 | Status: SHIPPED | OUTPATIENT
Start: 2020-10-25 | End: 2020-12-04 | Stop reason: ALTCHOICE

## 2020-10-25 RX ORDER — HYDROMORPHONE HYDROCHLORIDE 1 MG/ML
1 INJECTION, SOLUTION INTRAMUSCULAR; INTRAVENOUS; SUBCUTANEOUS
Status: DISCONTINUED | OUTPATIENT
Start: 2020-10-25 | End: 2020-10-25 | Stop reason: HOSPADM

## 2020-10-25 RX ORDER — ACETAMINOPHEN 500 MG
1000 TABLET ORAL EVERY 8 HOURS
Qty: 30 TABLET | Refills: 0 | Status: SHIPPED | OUTPATIENT
Start: 2020-10-25 | End: 2020-12-10

## 2020-10-25 RX ORDER — DEXAMETHASONE SODIUM PHOSPHATE 4 MG/ML
4 INJECTION, SOLUTION INTRA-ARTICULAR; INTRALESIONAL; INTRAMUSCULAR; INTRAVENOUS; SOFT TISSUE EVERY 6 HOURS
Status: DISCONTINUED | OUTPATIENT
Start: 2020-10-25 | End: 2020-10-25 | Stop reason: HOSPADM

## 2020-10-25 RX ADMIN — HYDROMORPHONE HYDROCHLORIDE 1 MG: 1 INJECTION, SOLUTION INTRAMUSCULAR; INTRAVENOUS; SUBCUTANEOUS at 11:07

## 2020-10-25 RX ADMIN — HYDROMORPHONE HYDROCHLORIDE 1 MG: 1 INJECTION, SOLUTION INTRAMUSCULAR; INTRAVENOUS; SUBCUTANEOUS at 03:46

## 2020-10-25 RX ADMIN — Medication 500 ML: at 11:02

## 2020-10-25 RX ADMIN — HYDROMORPHONE HYDROCHLORIDE 1 MG: 1 INJECTION, SOLUTION INTRAMUSCULAR; INTRAVENOUS; SUBCUTANEOUS at 06:47

## 2020-10-25 RX ADMIN — DEXAMETHASONE SODIUM PHOSPHATE 4 MG: 4 INJECTION, SOLUTION INTRAMUSCULAR; INTRAVENOUS at 08:56

## 2020-10-25 RX ADMIN — SODIUM CHLORIDE: 9 INJECTION, SOLUTION INTRAVENOUS at 08:56

## 2020-10-25 RX ADMIN — DEXAMETHASONE SODIUM PHOSPHATE 4 MG: 4 INJECTION, SOLUTION INTRAMUSCULAR; INTRAVENOUS at 03:46

## 2020-10-25 ASSESSMENT — PAIN DESCRIPTION - FREQUENCY
FREQUENCY: INTERMITTENT
FREQUENCY: CONTINUOUS

## 2020-10-25 ASSESSMENT — PAIN SCALES - GENERAL
PAINLEVEL_OUTOF10: 6
PAINLEVEL_OUTOF10: 7
PAINLEVEL_OUTOF10: 6
PAINLEVEL_OUTOF10: 7
PAINLEVEL_OUTOF10: 0
PAINLEVEL_OUTOF10: 5

## 2020-10-25 ASSESSMENT — PAIN DESCRIPTION - PROGRESSION
CLINICAL_PROGRESSION: GRADUALLY IMPROVING
CLINICAL_PROGRESSION: GRADUALLY WORSENING
CLINICAL_PROGRESSION: GRADUALLY IMPROVING
CLINICAL_PROGRESSION: GRADUALLY IMPROVING

## 2020-10-25 ASSESSMENT — PAIN DESCRIPTION - ONSET
ONSET: ON-GOING
ONSET: GRADUAL

## 2020-10-25 ASSESSMENT — PAIN - FUNCTIONAL ASSESSMENT
PAIN_FUNCTIONAL_ASSESSMENT: PREVENTS OR INTERFERES SOME ACTIVE ACTIVITIES AND ADLS
PAIN_FUNCTIONAL_ASSESSMENT: PREVENTS OR INTERFERES SOME ACTIVE ACTIVITIES AND ADLS

## 2020-10-25 ASSESSMENT — PAIN DESCRIPTION - DESCRIPTORS
DESCRIPTORS: ACHING;THROBBING;STABBING
DESCRIPTORS: THROBBING;SHARP

## 2020-10-25 ASSESSMENT — PAIN DESCRIPTION - ORIENTATION
ORIENTATION: LEFT
ORIENTATION: LEFT

## 2020-10-25 ASSESSMENT — PAIN DESCRIPTION - PAIN TYPE
TYPE: ACUTE PAIN;SURGICAL PAIN
TYPE: SURGICAL PAIN

## 2020-10-25 ASSESSMENT — PAIN DESCRIPTION - LOCATION
LOCATION: ANKLE
LOCATION: FOOT;ANKLE

## 2020-10-25 NOTE — PROGRESS NOTES
Rosalba in and talked with the pt at length about pain control, changed her pain meds to roxicodone PRn and then tylenol routinely throughout the day and pt voiced understanding, to refill Bupivacaine pump prior to discharge

## 2020-10-25 NOTE — ED NOTES
Telephone report given to Felipe Olguin, 2016.   She asked to wait 30 min before bring pt as she just another admit from ED     Franck Nunez RN  10/25/20 7847

## 2020-10-25 NOTE — PLAN OF CARE
Problem: Falls - Risk of:  Goal: Will remain free from falls  Description: Will remain free from falls  10/25/2020 1035 by Pa Wilcox RN  Outcome: Ongoing  10/25/2020 0537 by Stanley Farr RN  Outcome: Ongoing  Goal: Absence of physical injury  Description: Absence of physical injury  10/25/2020 0537 by Stanley Farr RN  Outcome: Ongoing     Problem: Pain:  Goal: Pain level will decrease  Description: Pain level will decrease  10/25/2020 1035 by Pa Wilcox RN  Outcome: Ongoing  Note: Pain level assessment complete.    Patient educated on pain scale and control interventions  PRN pain medication given per patient request  Patient instructed to call out with new onset of pain or unrelieved pain , pt voicing much better pain control, understands importance of keeping left foot elevated above the level of he heart, use of ice and need to take pain meds   10/25/2020 0537 by Stanley Farr RN  Outcome: Ongoing  Goal: Control of acute pain  Description: Control of acute pain  10/25/2020 0537 by Stanley Farr RN  Outcome: Ongoing  Goal: Control of chronic pain  Description: Control of chronic pain  10/25/2020 0537 by Stanley Farr RN  Outcome: Ongoing  Goal: Patient's pain/discomfort is manageable  Description: Patient's pain/discomfort is manageable  10/25/2020 0537 by Stanley Farr RN  Outcome: Ongoing     Problem: Infection:  Goal: Will remain free from infection  Description: Will remain free from infection  10/25/2020 1035 by Pa Wilcox RN  Outcome: Ongoing  10/25/2020 0537 by Stanley Farr RN  Outcome: Ongoing     Problem: Safety:  Goal: Free from accidental physical injury  Description: Free from accidental physical injury  10/25/2020 1035 by Pa Wilcox RN  Outcome: Ongoing  10/25/2020 0537 by Stanley Farr RN  Outcome: Ongoing  Goal: Free from intentional harm  Description: Free from intentional harm  10/25/2020 0537 by Stanley Farr RN  Outcome: Ongoing     Problem: Daily Care:  Goal: Daily care needs are met  Description: Daily care needs are met  10/25/2020 1035 by Soledad Velásquez RN  Outcome: Ongoing  10/25/2020 0537 by Alton Arrington RN  Outcome: Ongoing     Problem: Skin Integrity:  Goal: Skin integrity will stabilize  Description: Skin integrity will stabilize  10/25/2020 1035 by Soledad Velásquez RN  Outcome: Ongoing  10/25/2020 0537 by Alton Arrington RN  Outcome: Ongoing     Problem: Discharge Planning:  Goal: Patients continuum of care needs are met  Description: Patients continuum of care needs are met  10/25/2020 1035 by Soledad Velásquez RN  Outcome: Ongoing  10/25/2020 0537 by Alton Arrington RN  Outcome: Ongoing

## 2020-10-25 NOTE — PROGRESS NOTES
Reviewed discharge AVS with the pt , she voiced understanding, have refilled the Marcaine pump and pt is familiar with it's use since she has had it since the surgery, familiar with needing to remove the catheter when the infusion is complete.

## 2020-10-25 NOTE — H&P
CHIEF COMPLAINT:         Chief Complaint   Patient presents with    Foot Pain       Left Foot         HISTORY OF PRESENT ILLNESS:       The patient is a 39 y.o. female who is being seen for consultation and evaluation of pain at the left lateral hindfoot, which began due to an injury on 12/7/2016 secondary to a fall down stairs while at work (treated nonoperatively). The pain is described mainly with mechanical terms (dull/sharp/throbbing). The pain is worse with activity and better with rest. The patient reports a progressive course.      The patient has tried:   Radley Prophet    [x]? rest/activity modification          [x]? NSAIDs                     []?  opiates          []? orthotics        []? change in shoes   []?  home exercises           []? physical therapy        [x]? CAM boot     []? brace:    [x]?  injection:       []?  surgery:       She was previously seen by Dr. Donna Holloway for this issue.     INTERVAL HISTORY 8/21/2020:  She is seen again today in the office for follow up of a CT scan. Since being seen last, the patient is doing worse. She is ambulating today using regular shoes without a brace or assistive device. The location and quality of the pain have not significantly changed since the last visit. She is here today with her brother to discuss surgery.     INTERVAL HISTORY 10/21/2020:  She is seen again today in the office for follow up of a previous issue (as above). Since being seen last, she reports the problem has not significantly improved. At today's visit, she is using no brace or assistive device. The location and quality of the pain have not significantly changed since the last visit. She is here today for a preoperative visit, and wishes to proceed with surgery as planned.  She denies any other significant changes in medical history.      Interval history 10/25/2020: She is here today secondary to uncontrolled pain at home on oral pain medicine, status post surgery on 10/23/2020.        REVIEW OF SYSTEMS:  Constitutional: Negative for fever. HENT: Negative for tinnitus.    Eyes: Negative for pain. Respiratory: Negative for shortness of breath.    Cardiovascular: Negative for chest pain. Gastrointestinal: Negative for abdominal pain. Genitourinary: Negative for dysuria. Skin: Negative for rash. Neurological: Negative for headaches. Hematological: Does not bruise/bleed easily.   Musculoskeletal: See HPI for pertinent positives     Past Medical History:    She   Past Medical History in prose (no negatives)    has a past medical history of Anxiety, Depression, Fibromyalgia, and Wears contact lenses.         Past Surgical History:    She  has a past surgical history that includes Breast surgery (Right).      Current Medications:   Current Medication     Current Outpatient Medications:     citalopram (CELEXA) 40 MG tablet, Take 40 mg by mouth, Disp: , Rfl:     ibuprofen (ADVIL;MOTRIN) 800 MG tablet, Take 800 mg by mouth, Disp: , Rfl:     traMADol (ULTRAM) 50 MG tablet, Take 50 mg by mouth. ., Disp: , Rfl:     gabapentin (NEURONTIN) 300 MG capsule, 3 tabs q HS, Disp: , Rfl:     hydrOXYzine (VISTARIL) 25 MG capsule, Take 25 mg by mouth, Disp: , Rfl:     orphenadrine (NORFLEX) 100 MG extended release tablet, Take 100 mg by mouth, Disp: , Rfl:          Allergies:    Codeine     Family History:  family history includes Hypertension in her mother.     Social History:   Social History            Occupational History    Not on file   Tobacco Use    Smoking status: Never Smoker    Smokeless tobacco: Never Used   Substance and Sexual Activity    Alcohol use: Yes       Alcohol/week: 3.0 standard drinks       Types: 3 Glasses of wine per week    Drug use: No    Sexual activity: Not on file      Occupation: Full-time medical billing. [employer], and will not accept blood products. She reports that her   in 2019 from stage IV colon cancer. OBJECTIVE:  Temp 97.2 °F (36.2 °C)   Resp 16   Ht 5' 7\" (1.702 m)   Wt 220 lb (99.8 kg)   BMI 34.46 kg/m²    Psych: alert and oriented to person, time, and place. No acute distress. Cardio:  well perfused extremities  Resp:  normal respiratory effort  Skin:  no cyanosis  Hem/lymph:  no lymphedema  Neuro:  sensation to light touch grossly intact throughout all nerve distributions in the foot   Musculoskeletal:      Splint in place, clean/dry/intact  Toes warm/well perfused  Skin intact at splint margins  Nerve block in place           RADIOLOGY:   10/24/2020 No new radiology images today. Prior images reviewed for reference. Prior images reviewed for reference. CT images and radiology report reviewed, as below:    CT ankle:         1. Calcaneus/hindfoot valgus.  Moderate degenerative changes of the subtalar    joint.  Diffuse osteopenia. 2. Os trigonum. 3. Mild soft tissue swelling and edema of the medial left great toe.  Mild    edema in the subcutaneous fat about the ankle and foot. 4. No acute osseous abnormality. CT foot:         1. Calcaneus/hindfoot valgus.  Moderate degenerative changes at the subtalar    joint.  Diffuse osteopenia. 2. Os trigonum. 3. Mild edema and soft tissue swelling at the medial left great toe and about    the ankle and foot. 4. No acute osseous abnormality.                     FINDINGS:  Three weightbearing views (AP, Mortise, and Lateral) of the left ankle and three weightbearing views (AP, Oblique, Lateral) of the left foot and 2 weightbearing views (axial and lateral) of the bilateral calcaneus were obtained in the office today and reviewed, revealing no acute fracture, dislocation, or radioopaque foreign body/tumor.  Degenerative changes of the left subtalar joint with joint narrowing, sclerosis, and osteophytes, along with loss of calcaneal length and height, as well as evidence of anterior ankle impingement     IMPRESSION:  No acute mobilization + medication previously prescribed (aspirin 325 mg p.o. daily)  -Ice (15-20 mins on,  mins off)  -Elevate (4-8\" above heart, without direct pressure on posterior heel)--we did discuss this as well in detail, particularly as it relates to pressure on her incision as well as pain control. Her pillow that her leg were readjusted today in the hospital, and I demonstrated the appropriate positioning of her foot, in order to decrease the pressure on her incision.  -Incentive Spirometry use  -PT/OT  -Home today   -F/u in office in 2 weeks (call office in 1-3 days to confirm appt)           Alaina Vasquez MD  Orthopedic Surgery           Please excuse any typos/errors, as this note was created with the assistance of voice recognition software. While intending to generate a document that actually reflects the content of the visit, the document can still have some errors including those of syntax and sound-a-like substitutions which may escape proof reading. In such instances, actual meaning can be extrapolated by context.

## 2020-10-25 NOTE — PROGRESS NOTES
Discharged per wheelchair with family with all of her belongings, Rosalba was perfect served and reminded to  Call in a Narcan dose for the pt as they had spoken about it prior to discharge and that was done

## 2020-10-25 NOTE — PROGRESS NOTES
Progress Note    Patient:  Lester Alvarez  YOB: 1975     39 y.o. female      Subjective:  Patient seen and examined in the hospital. No complaints or concerns currently. Pain controlled currently. Objective:   Vitals:    10/25/20 0738   BP: 127/82   Pulse: 97   Resp: 16   Temp: 98.4 °F (36.9 °C)   SpO2: 96%     Gen: NAD, awake, alert  MSK:  Splint in place, clean/dry/intact  Toes warm/well perfused  Skin intact at splint margins  Nerve block in place      Labs:    Recent Labs     10/24/20  2106   WBC 10.6   HGB 12.6   HCT 39.1         K 3.7   BUN 9   CREATININE 1.02*   GLUCOSE 117*          Impression/plan:   39 y.o. female postop day 2, doing well overall but with pain at home once her nerve block wore off. I spoke to the patient 3 times on Saturday, 10/24/2020, the patient did present to the emergency department last evening, where she was given IV pain medicine. On Saturday, she had 5 total pills from her prescription of Percocet, and reports that she was nervous to take more, given that her brother  of an overdose from pain pills in the past.    -Pain control. We discussed that she will not take NSAIDs, and she will not take tramadol. She does report that she has been taking 2 tramadol daily, and drinks wine on a regular basis, reporting that she had 3 glasses of wine the night before surgery. She was given a new prescription for oxycodone for her acute postoperative pain, as well as a separate prescription for acetaminophen, and she will not take more than 3000 mg of acetaminophen in a 24-hour period. Due to the fact that the patient is currently taking medications which can interact with opioids, I did prescribe a naloxone rescue kit, as I am concerned for the possibility of accidental overdose while at home. We did discuss opioid risks, avoiding multiple medications, and the patient understands these risks.        -NWB in splint  -DVT ppx:  Early mobilization + medication previously prescribed (aspirin 325 mg p.o. daily)  -Ice (15-20 mins on,  mins off)  -Elevate (4-8\" above heart, without direct pressure on posterior heel)--we did discuss this as well in detail, particularly as it relates to pressure on her incision as well as pain control.   Her pillow that her leg were readjusted today in the hospital, and I demonstrated the appropriate positioning of her foot, in order to decrease the pressure on her incision.  -Incentive Spirometry use  -PT/OT  -Home today   -F/u in office in 2 weeks (call office in 1-3 days to confirm appt)

## 2020-10-25 NOTE — DISCHARGE INSTR - ACTIVITY
Up as tolerated, No Weight on the left foot, use walker or knee walker to keep weight off the left foot, Use ice to the left foot for 15-20 minutes on, and  minutes off, elevate the left foot 4-8 inches above the level of the heart without direct pressure on the posterior heel

## 2020-10-25 NOTE — OP NOTE
[place] [address]  Dept: [phone]: [phone]      Orthopedic Surgery Operative Report      Patient: Jazmín Rutledge      MRN#: [id number]     YOB: 1975      Date of Admission: 10/23/2020    Attending Surgeon: Cassandra Grimes M.D.   PCP: Esthela Benjamin          Preoperative Diagnosis:   1. 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 Left ankle anterior ankle impingement  3. Left ankle equinus contracture  4. Fibromyalgia  5. Body mass index is 34.46 kg/m². Postoperative Diagnosis:   1. Same     Procedures Performed:  (10/23/2020)  1. Left foot subtalar fusion   2. Left calcaneal osteotomy  3. Left percutaneous tendoachilles lengthening, performed through separate incision       Implants:    Carol 4.0 mm partially-threaded cannulated screws for the osteotomy, 5.0 mm partially-threaded cannulated screws through the subtalar joint fusion  Implant Name Type Inv.  Item Serial No.  Lot No. LRB No. Used Action   [employer] ALLOMATRIX 5CC - [id number] Bone/Graft/Tissue/Human/Synth DYLAN-PUTTY SUBST CAN ALLOMATRIX 5C [id number] [facility]  Left 1 Implanted   DYLAN-GRAFT BONE AUGMENT 3ML INJ Bone/Graft/Tissue/Human/Synth DYLAN-GRAFT BONE AUGMENT 3ML INJ  [employer] [id number] Left 1 Implanted   SCREW RACHEAL ASNIS III 5.0X70 NS Screw/Plate/Nail/Eren SCREW RACHEAL ASNIS III 5.0X70 NS  CAROL: ORTHOPAEDICS  Left 2 Implanted   SCREW RACHEAL PTHRD ASNIS III 4.0X60MM Screw/Plate/Nail/Eren SCREW RACHEAL PTHRD ASNIS III 4.0X60MM  CAROL: ORTHOPAEDICS  Left 2 Implanted         Attending Surgeon:     Marissa Feldman MD      Assistant Surgeon:    Resident: Silvia Gaston DO      Anesthesia:    General      Staff:  Surgeon(s):  Danii Jimenez MD  Scrub Person First: Marielena Carrasquillo  Anesthesia: No responsible provider has been subchondral plate was then fenestrated to stimulate healing. Bone graft [facility] allomatrix plus PDGF Augment plus local autograft) was added and packed into place. Attention was then turned to performing the calcaneal osteotomy. The proposed osteotomy site was marked out under fluoroscopy and verified to be at the desired location. The osteotomy was carefully performed with a saw, while protecting the surrounding soft tissues. Once this was completed, the osteotomized tuberosity was translated medially 8 mm and distally 7 mm, and then provisionally pinned into place, to confirm adequate positioning. Fluoroscopy was then utilized to verify the appropriate correction had been obtained. The subtalar joint was manually reduced and provisionally pinned into place to check the alignment both clinically and on fluoroscopy. The joint was deemed to be in the appropriate position. Multiple guidewires were then advanced under fluoroscopic guidance across the osteotomy site, as well as the subtalar joint. After fluoroscopy was used to verify the position of the guidewires, the screw lengths were measured, and the screw paths were drilled to the desired depth. To secure the osteotomy site, a construct consisting of 2 4.0 mm partially threaded cannulated screws was used. The subtalar joint was then fused with 2 5.0 mm partially threaded cannulated screws. good compression across the subtalar joint and the osteotomy site were noted upon insertion of the above hardware. Final radiographs were obtained and reviewed, showing safe positioning of the implants with the hindfoot in appropriate position. The foot and toes were all noted to be well perfused. The fixation was stable. Copious sterile irrigation was used to rinse the operative sites, and a layered closure was performed using 0 vicryl, 2-0 vicryl and 3-0 nylon (using Allgower sutures), providing appropriate tension to the skin.  The skin was cleaned and gently dried. Steri-Strips were then applied, and anti-bacterial ointment was applied on top of that, with Adaptic and fluffs. A sterile layer of cast padding was then applied, and a short leg splint was then applied with the ankle at neutral.    The patient was aroused from anesthesia without complication, and gently transferred to the bed and then transported to the postoperative area in a stable condition. The patient was noted to have tolerated the procedure well without complication. Postoperative Plan:         Precautions:  nonweight bearing x 8 weeks anticipated on the Left lower extremity   -             []  Physical Therapy/Home exercises       Immobilization:      [x]  Splint/Cast  []  CAM boot  []  Comfortable shoe    []  Other:      DVT ppx:   [x]  Early mobilization       [x]  Medication as prescribed (Aspirin 325 mg PO q Day)      []  No chemical ppx needed; mechanical only   -    Pain control:  Medication as prescribed (dosing and quantity) indicated for acute postoperative pain control   -    Special concerns:       []  Vitamin D -- due to relatively low level, the patient will take supplementation         []  Prealbumin -- due to relatively low level, the patient will take supplementation    []  Glucose control            []  Tobacco cessation     []  Pin pulling in office at 6 weeks anticipated    []  Follow up on pathology results    []  Follow up on culture results    []          [x]  Avoid strenuous activity/pain provoking maneuvers and high-impact repetitive exercises    -    Disposition:   PACU      The patient has been instructed to follow up in the office. The particulars of surgery as well as the condition of the patient postoperatively were discussed with the patients family thereafter per the patient's wishes.            Jessica Priest MD  Orthopedic Surgery

## 2020-10-25 NOTE — PROGRESS NOTES
Pt admitted to room 2016 per cart in stable condition from ED. Oriented to room and surroundings  Bed in lowest position, wheels locked, 2/4 side rails up  Call light in reach, room free of clutter, adequate lighting provided  Denies any further questions at this time  Instructed to call out with any questions/concerns/new onset of pain and/or n/v   White board updated  Continue to monitor with hourly rounding  STAY WITH ME protocol initiated   Bed alarm on/Fall Risk signs in place/Fall risk sticker to wrist band  Non-skid socks on/at bedside  [address] in place for patient/family to view & ask questions.

## 2020-10-25 NOTE — ED PROVIDER NOTES
[address] [place] [address] ED  eMERGENCY dEPARTMENT eNCOUnter      Pt Name: Surinder Dumont  MRN: [id number]  [place] 1975  Date of evaluation: 10/24/2020  Provider: Bishop Norris MD    CHIEF COMPLAINT       Chief Complaint   Patient presents with    Post-op Problem     pain, left ankle          HISTORY OF PRESENT ILLNESS  (Location/Symptom, Timing/Onset, Context/Setting, Quality, Duration, Modifying Factors, Severity.)   Surinder Dumont is a 39 y.o. female who presents to the emergency department due to severe pain involving her left ankle region. Patient is postop day 1 status post a talar fusion procedure. Procedure performed with Dr. Melissa Wright. Patient was discharged home with a pain pump in place. She has also been taking oral Percocet. She has been taking one half of a tab at a time. The pump remains in process at regulated dose. She states her pain has been severe and unrelieved. No numbness to the foot. Her toes remain pink with brisk capillary refill. She has had no falls injury or trauma. She states she can no longer tolerate the postop pain she is experiencing      Nursing Notes were reviewed. ALLERGIES     Codeine    CURRENT MEDICATIONS       Previous Medications    ASPIRIN 325 MG EC TABLET    Take 1 tablet by mouth daily    CITALOPRAM (CELEXA) 40 MG TABLET    Take 40 mg by mouth    DOCUSATE SODIUM (COLACE) 100 MG CAPSULE    Take 1 capsule by mouth 2 times daily as needed for Constipation    GABAPENTIN (NEURONTIN) 300 MG CAPSULE    3 tabs q HS    HYDROXYZINE (VISTARIL) 25 MG CAPSULE    Take 25 mg by mouth    ONDANSETRON (ZOFRAN) 4 MG TABLET    Take 1 tablet by mouth every 8 hours as needed for Nausea or Vomiting    OXYCODONE-ACETAMINOPHEN (PERCOCET) 5-325 MG PER TABLET    Take 1 tablet by mouth every 6 hours as needed for Pain for up to 7 days. Do not exceed 3g of Acetaminophen in 24 hrs.     SULFAMETHOXAZOLE-TRIMETHOPRIM (BACTRIM DS;SEPTRA DS) 800-160 MG PER TABLET    Take 1 tablet by mouth 2 times daily for 5 days       PAST MEDICAL HISTORY         Diagnosis Date    Anxiety     Depression     Fibromyalgia     Wears contact lenses        SURGICAL HISTORY           Procedure Laterality Date    BREAST SURGERY Right     Marker          FAMILY HISTORY           Problem Relation Age of Onset    Hypertension Mother      Family Status   Relation Name Status    Mother Shannon Degroot         SOCIAL HISTORY      reports that she has never smoked. She has never used smokeless tobacco. She reports current alcohol use of about 3.0 standard drinks of alcohol per week. She reports that she does not use drugs. REVIEW OF SYSTEMS    (2-9 systems for level 4, 10 or more for level 5)     Review of Systems   All other systems reviewed and are negative. Except as noted above the remainder of the review of systems was reviewed and negative. PHYSICAL EXAM    (up to 7 for level 4, 8 or more for level 5)     Vitals:    10/24/20 2046 10/24/20 2049   BP: 123/83    Pulse: 123    Resp: 18    Temp: 99.7 °F (37.6 °C)    SpO2: 98%    Weight:  223 lb (101.2 kg)   Height:  5' 7\" (1.702 m)       Physical exam reflects a pleasant well-nourished well-hydrated female. She does have low-grade temp. She is mildly tachycardic. She she is not hypoxic. Pulse ox 98% on room air. She is alert conversive and appropriate in behavior. Oral pharyngeal exam without lesion. She is able to breathe speak and swallow without difficulty. Heart regular rate and rhythm normal S1-S2 no murmurs rubs gallops. Lungs are clear to auscultation without wheezes rales or rhonchi. Abdomen is soft throughout. Bulky postop dressing remains in place to the left lower leg. Dressing is warm and dry and in good repair. Toes are pink warm with good capillary refill noted. Postop dressing is left in place pending my discussion with Dr. Karyn Cintron. The foot appears to be neurovascularly intact.   No swelling or discoloration proximal to the dressing. DIAGNOSTIC RESULTS       RADIOLOGY:   Non-plain film images such as CT, Ultrasound and MRI are read by the radiologist. Plain radiographic images are visualized and preliminarily interpreted by the emergency physician with the below findings:        Interpretation per the Radiologist below, if available at the time of this note:    No orders to display         LABS:  Labs Reviewed   CBC WITH AUTO DIFFERENTIAL - Abnormal; Notable for the following components:       Result Value    Seg Neutrophils 77 (*)     Lymphocytes 13 (*)     Eosinophils % 0 (*)     Immature Granulocytes 1 (*)     Segs Absolute 8.15 (*)     All other components within normal limits   BASIC METABOLIC PANEL - Abnormal; Notable for the following components:    Glucose 117 (*)     CREATININE 1.02 (*)     Calcium 8.5 (*)     GFR Non- 59 (*)     All other components within normal limits       All other labs were within normal range or not returned as of this dictation. EMERGENCY DEPARTMENT COURSE and DIFFERENTIAL DIAGNOSIS/MDM:   Vitals:    Vitals:    10/24/20 2046 10/24/20 2049   BP: 123/83    Pulse: 123    Resp: 18    Temp: 99.7 °F (37.6 °C)    SpO2: 98%    Weight:  223 lb (101.2 kg)   Height:  5' 7\" (1.702 m)     Patient is treated symptomatically for her pain. Baseline labs are requested. Patient's presentation is discussed with her surgeon of record. Patient is given additional pain meds. She continues to have discomfort. She states she cannot function overnight in the home setting. Care is therefore discussed with Dr. Joseph Denis with regard to admission overnight for continued pain management and anesthesia consultation for adjustment of her nerve block as well as additional pain management recommendations. CONSULTS:  IP CONSULT TO PODIATRY    PROCEDURES:  None    FINAL IMPRESSION      1.  Post-op pain          DISPOSITION/PLAN   DISPOSITION Decision To Admit 10/24/2020 10:38:25 PM      PATIENT

## 2020-11-03 ENCOUNTER — TELEPHONE (OUTPATIENT)
Dept: ORTHOPEDIC SURGERY | Age: 45
End: 2020-11-03

## 2020-11-03 NOTE — TELEPHONE ENCOUNTER
Fausto Louis a  called in stating she would like someone to follow up with aliza as she was having 10/10 pain while she was talking with her. I called patient, she did not answer. She does have a follow up appointment in the clinic tomorrow.

## 2020-11-04 ENCOUNTER — OFFICE VISIT (OUTPATIENT)
Dept: ORTHOPEDIC SURGERY | Age: 45
End: 2020-11-04

## 2020-11-04 VITALS — RESPIRATION RATE: 18 BRPM | WEIGHT: 237 LBS | BODY MASS INDEX: 37.2 KG/M2 | TEMPERATURE: 97.3 F | HEIGHT: 67 IN

## 2020-11-04 PROCEDURE — 99024 POSTOP FOLLOW-UP VISIT: CPT | Performed by: ORTHOPAEDIC SURGERY

## 2020-11-04 RX ORDER — OXYCODONE HYDROCHLORIDE AND ACETAMINOPHEN 5; 325 MG/1; MG/1
1 TABLET ORAL EVERY 6 HOURS PRN
Qty: 20 TABLET | Refills: 0 | Status: SHIPPED | OUTPATIENT
Start: 2020-11-04 | End: 2020-11-11

## 2020-11-04 NOTE — LETTER
[address] [place] [address] and Sports Medicine  [place] [address]  Phone: [phone]  Fax: [phone]    Fran Schwartz MD        November 4, 2020     Patient: Truman Henry   YOB: 1975   Date of Visit: 11/4/2020       To Whom it May Concern:    Laura Robin was seen in my clinic on 11/4/2020. She will remain out of work for an additional 4 weeks. If you have any questions or concerns, please don't hesitate to call.     Sincerely,     The office of Samanta Blas MD

## 2020-11-04 NOTE — PROGRESS NOTES
[facility]  [address] [address]  Dept: [phone]    Ambulatory Orthopedic Postoperative Visit     Preoperative Diagnosis:   1. 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 Left ankle anterior ankle impingement  3. Left ankle equinus contracture  4. Fibromyalgia  5. Body mass index is 34.46 kg/m².     Postoperative Diagnosis:   1. Same      Procedures Performed:  (10/23/2020)  1. Left foot subtalar fusion   2. Left calcaneal osteotomy  3. Left percutaneous tendoachilles lengthening, performed through separate incision        SUBJECTIVE:     The patient returns 2 weeks post op from the above stated procedure. Reports doing well overall, reports improved pain, denies wound drainage/issues, fevers/chills/night sweats, calf swelling/pain, chest pain, shortness of breath. No complaints. The patient did remove her splint at home, and reports that she has been scratching her lateral hindfoot, she reports significant itchiness. OBJECTIVE:  Temp 97.3 °F (36.3 °C)   Resp 18   Ht 5' 7\" (1.702 m)   Wt 237 lb (107.5 kg)   BMI 37.12 kg/m²    NAD, resting comfortably  Incisions clean/dry/intact, no dehiscence/drainage  Sensation to light touch grossly intact throughout   Warm and well perfused  Grossly neurovascularly intact distally  No signs of infection  Possible hematoma underneath her flap with somewhat purplish hue, but flap is well perfused; there is a surrounding maculopapular erythematous rash, along with some visible scratch marks      RADIOLOGY:   11/4/2020 No new radiology images today. Prior images reviewed for reference. ASSESSMENT AND PLAN:     2 weeks s/p above, doing well overall, but with itchiness at the site of her extensile lateral flap, possibly due to a maculopapular erythematous rash.   There is a possible hematoma underneath her flap, but it is hard to tell, and may represent a result of her scratching/rash. We had a detailed discussion about avoiding scratching, and using Benadryl instead. We discussed the risks of wound breakdown and infection. []  Sutures were removed and steri-strips applied          Precautions:  nonweight bearing x 8 weeks anticipated on the Left lower extremity             -             []?  Physical Therapy/Home exercises        Immobilization:      [x]? Splint/Cast --cast today              []?  CAM boot                    []?  Comfortable shoe    []? Other:                DVT ppx:   [x]? Early mobilization              [x]? Medication as prescribed (Aspirin 325 mg PO q Day)                     []?  No chemical ppx needed; mechanical only             -     Pain control:  Medication as prescribed (dosing and quantity) indicated for acute postoperative pain control             -     Special concerns:       []? Vitamin D -- due to relatively low level, the patient will take supplementation                           []?  Prealbumin -- due to relatively low level, the patient will take supplementation    []? Glucose control            []? Tobacco cessation     []? Pin pulling in office at 6 weeks anticipated    []? Follow up on pathology results    []? Follow up on culture results    []?            [x]? Avoid strenuous activity/pain provoking maneuvers and high-impact repetitive exercises     -      All questions were answered and the patient agrees with the above plan. The patient will return to clinic in 2 weeks without x-rays for suture removal and wound check. At her next visit, depending on the appearance of her skin, I will consider a PCP/Derm/wound care referral.         Return in about 4 weeks (around 12/2/2020).     Orders Placed This Encounter   Medications    oxyCODONE-acetaminophen (PERCOCET) 5-325 MG per tablet     Sig: Take 1 tablet by mouth every 6 hours as needed for Pain for up to 7 days. Do not exceed 3g of Acetaminophen in 24 hrs. Dispense:  20 tablet     Refill:  0     Reduce doses taken as pain becomes manageable     No orders of the defined types were placed in this encounter. John Al MD  Orthopedic Surgery        Please excuse any typos/errors, as this note was created with the assistance of voice recognition software. While intending to generate a document that actually reflects the content of the visit, the document can still have some errors including those of syntax and sound-a-like substitutions which may escape proof reading. In such instances, actual meaning can be extrapolated by context.

## 2020-11-20 ENCOUNTER — OFFICE VISIT (OUTPATIENT)
Dept: ORTHOPEDIC SURGERY | Age: 45
End: 2020-11-20

## 2020-11-20 VITALS — WEIGHT: 237 LBS | RESPIRATION RATE: 12 BRPM | BODY MASS INDEX: 37.12 KG/M2 | TEMPERATURE: 97.3 F

## 2020-11-20 PROCEDURE — 99024 POSTOP FOLLOW-UP VISIT: CPT | Performed by: ORTHOPAEDIC SURGERY

## 2020-11-20 NOTE — PROGRESS NOTES
[facility]  [address] [address]  Dept: [phone]    Ambulatory Orthopedic Postoperative Visit     Preoperative Diagnosis:   1. 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 Left ankle anterior ankle impingement  3. Left ankle equinus contracture  4. Fibromyalgia  5. Body mass index is 34.46 kg/m².     Postoperative Diagnosis:   1. Same      Procedures Performed:  (10/23/2020)  1. Left foot subtalar fusion   2. Left calcaneal osteotomy  3. Left percutaneous tendoachilles lengthening, performed through separate incision        SUBJECTIVE:     The patient returns 4 weeks post op from the above stated procedure. Reports doing well overall, reports improved pain, denies wound drainage/issues, fevers/chills/night sweats, calf swelling/pain, chest pain, shortness of breath. No complaints. She reports that her itchiness is significantly decreased, and reports that her rash has improved. OBJECTIVE:  Temp 97.3 °F (36.3 °C)   Resp 12   Wt 237 lb (107.5 kg)   BMI 37.12 kg/m²    NAD, resting comfortably  Incisions clean/dry/intact, no dehiscence/drainage  Sensation to light touch grossly intact throughout   Warm and well perfused  Grossly neurovascularly intact distally  No signs of infection  Rash has resolved, no scratch marks  Hematoma/color change has resolved, but some wound slough that appears like superficial eschar at the apex of the extensile lateral      RADIOLOGY:   11/20/2020 No new radiology images today. Prior images reviewed for reference. ASSESSMENT AND PLAN:     4 weeks s/p above, doing well overall; her postoperative course was complicated by itchiness and a maculopapular erythematous rash, which has resolved with time.           [x]  Sutures were removed and steri-strips applied          Precautions:  nonweight bearing x 8 weeks anticipated on the Left lower extremity             -             []?  Physical Therapy/Home exercises        Immobilization:      [x]? Splint/Cast --cast today              []?  CAM boot                    []?  Comfortable shoe    []? Other:                DVT ppx:   [x]? Early mobilization              [x]? Medication as prescribed (Aspirin 325 mg PO q Day)                     []?  No chemical ppx needed; mechanical only             -     Pain control:  Medication as prescribed (dosing and quantity) indicated for acute postoperative pain control             -     Special concerns:       []? Vitamin D -- due to relatively low level, the patient will take supplementation                           []?  Prealbumin -- due to relatively low level, the patient will take supplementation    []? Glucose control            []? Tobacco cessation     []? Pin pulling in office at 6 weeks anticipated    []? Follow up on pathology results    []? Follow up on culture results    []?            [x]? Avoid strenuous activity/pain provoking maneuvers and high-impact repetitive exercises     -We discussed continuing to take the aspirin, as she reports she has not been taking this. We discussed the risks of DVT again, and the patient expressed verbal understanding. All questions were answered and the patient agrees with the above plan. The patient will return to clinic in 4 weeks with simulated weightbearing x-rays out of plaster, foot and calcaneus. Return in about 2 weeks (around 12/4/2020). No orders of the defined types were placed in this encounter. No orders of the defined types were placed in this encounter. Manju Ly MD  Orthopedic Surgery        Please excuse any typos/errors, as this note was created with the assistance of voice recognition software.  While intending to generate a document that actually reflects the content of the visit, the document can still have some errors including those of syntax and sound-a-like substitutions which may escape proof reading. In such instances, actual meaning can be extrapolated by context.

## 2020-12-02 ENCOUNTER — OFFICE VISIT (OUTPATIENT)
Dept: ORTHOPEDIC SURGERY | Age: 45
End: 2020-12-02

## 2020-12-02 VITALS — RESPIRATION RATE: 16 BRPM | WEIGHT: 237 LBS | TEMPERATURE: 97.2 F | BODY MASS INDEX: 37.2 KG/M2 | HEIGHT: 67 IN

## 2020-12-02 PROCEDURE — 99024 POSTOP FOLLOW-UP VISIT: CPT | Performed by: ORTHOPAEDIC SURGERY

## 2020-12-02 RX ORDER — SULFAMETHOXAZOLE AND TRIMETHOPRIM 800; 160 MG/1; MG/1
1 TABLET ORAL 2 TIMES DAILY
Qty: 28 TABLET | Refills: 0 | Status: SHIPPED | OUTPATIENT
Start: 2020-12-02 | End: 2020-12-16

## 2020-12-02 NOTE — PROGRESS NOTES
[facility]  [address] [address]  Dept: [phone]    Ambulatory Orthopedic Postoperative Visit     Preoperative Diagnosis:   1. 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 Left ankle anterior ankle impingement  3. Left ankle equinus contracture  4. Fibromyalgia  5. Body mass index is 34.46 kg/m².     Postoperative Diagnosis:   1. Same      Procedures Performed:  (10/23/2020)  1. Left foot subtalar fusion   2. Left calcaneal osteotomy  3. Left percutaneous tendoachilles lengthening, performed through separate incision        SUBJECTIVE:     The patient returns 6 weeks post op from the above stated procedure. Reports doing well overall, reports improved pain, denies wound drainage/issues, fevers/chills/night sweats, calf swelling/pain, chest pain, shortness of breath. No complaints. She denies any itchiness. OBJECTIVE:  Temp 97.2 °F (36.2 °C)   Resp 16   Ht 5' 7\" (1.702 m)   Wt 237 lb (107.5 kg)   BMI 37.12 kg/m²    NAD, resting comfortably  Incisions clean/dry/intact, no dehiscence/drainage  Sensation to light touch grossly intact throughout   Warm and well perfused  Grossly neurovascularly intact distally  No signs of infection  Rash has resolved, no scratch marks  Hematoma/color change has resolved,   -Wynne of the wound has some superficial skin necrosis with wound dehiscence, does not probe deep to hardware/bone            RADIOLOGY:   12/2/2020 FINDINGS:  Three views (AP, Oblique, and Lateral) of the left foot and 2 views (axial and lateral) of the left calcaneus were obtained in the office today and reviewed, revealing no acute fracture, dislocation, or radioopaque foreign body/tumor. Intact hardware across the calcaneal osteotomy and subtalar joint. No interval displacement with interval healing noted.     IMPRESSION: Status post calcaneal osteotomy and subtalar fusion as above    Electronically signed by Diana Cuadra MD      ASSESSMENT AND PLAN:     6 weeks s/p above, doing well overall; her postoperative course was complicated by itchiness and a maculopapular erythematous rash, which has resolved with time. She now has an area of delayed wound healing with skin necrosis at the apex of her incision with some superficial appearing wound dehiscence. []  Sutures were removed and steri-strips applied          Precautions:  nonweight bearing x 8 weeks anticipated on the Left lower extremity             -             []?  Physical Therapy/Home exercises        Immobilization:      []? Splint/Cast          [x]? CAM boot                    []?  Comfortable shoe    []? Other:                DVT ppx:   [x]? Early mobilization              [x]? Medication as prescribed (Aspirin 325 mg PO q Day)                     []?  No chemical ppx needed; mechanical only             -     Pain control:  Medication as prescribed (dosing and quantity) indicated for acute postoperative pain control             -     Special concerns:       []? Vitamin D -- due to relatively low level, the patient will take supplementation                           []?  Prealbumin -- due to relatively low level, the patient will take supplementation    []? Glucose control            []? Tobacco cessation     []? Pin pulling in office at 6 weeks anticipated    []? Follow up on pathology results    []? Follow up on culture results    []?            [x]? Avoid strenuous activity/pain provoking maneuvers and high-impact repetitive exercises     -I am concerned about the appearance of her wound, and the area of delayed wound healing with skin necrosis. The patient was ordered 2 weeks of Bactrim antibiotics, and I referred the patient to the wound care clinic. All questions were answered and the patient agrees with the above plan.  The patient will return to clinic in 2 weeks without x-rays for a wound check. Return in about 2 weeks (around 12/16/2020). No orders of the defined types were placed in this encounter. Orders Placed This Encounter   Procedures    Ambulatory referral to Wound Clinic     Referral Priority:   Routine     Referral Type:   Eval and Treat     Referral Reason:   Specialty Services Required     Number of Visits Requested:   1         Jeff Roberts MD  Orthopedic Surgery        Please excuse any typos/errors, as this note was created with the assistance of voice recognition software. While intending to generate a document that actually reflects the content of the visit, the document can still have some errors including those of syntax and sound-a-like substitutions which may escape proof reading. In such instances, actual meaning can be extrapolated by context.

## 2020-12-04 ENCOUNTER — HOSPITAL ENCOUNTER (OUTPATIENT)
Age: 45
Discharge: HOME | End: 2020-12-04
Payer: COMMERCIAL

## 2020-12-04 ENCOUNTER — HOSPITAL ENCOUNTER (OUTPATIENT)
Dept: WOUND CARE | Age: 45
Discharge: HOME | End: 2020-12-04
Payer: COMMERCIAL

## 2020-12-04 VITALS
HEART RATE: 107 BPM | RESPIRATION RATE: 16 BRPM | TEMPERATURE: 100.2 F | BODY MASS INDEX: 33.9 KG/M2 | HEIGHT: 67 IN | SYSTOLIC BLOOD PRESSURE: 133 MMHG | WEIGHT: 216 LBS | DIASTOLIC BLOOD PRESSURE: 80 MMHG

## 2020-12-04 PROBLEM — M19.072 ARTHRITIS OF LEFT ANKLE: Status: ACTIVE | Noted: 2019-04-01

## 2020-12-04 PROBLEM — S91.002A ANKLE WOUND, LEFT, INITIAL ENCOUNTER: Status: ACTIVE | Noted: 2020-12-04

## 2020-12-04 PROBLEM — T14.8XXA NON-HEALING NON-SURGICAL WOUND: Status: ACTIVE | Noted: 2020-12-04

## 2020-12-04 PROBLEM — E66.09 CLASS 1 OBESITY DUE TO EXCESS CALORIES WITHOUT SERIOUS COMORBIDITY WITH BODY MASS INDEX (BMI) OF 33.0 TO 33.9 IN ADULT: Status: ACTIVE | Noted: 2020-12-04

## 2020-12-04 PROBLEM — E06.9 THYROIDITIS: Status: ACTIVE | Noted: 2020-12-04

## 2020-12-04 LAB
ALBUMIN SERPL-MCNC: 4.5 G/DL (ref 3.5–5.2)
PREALBUMIN: 27.9 MG/DL (ref 20–40)
TOTAL PROTEIN: 7.2 G/DL (ref 6.4–8.3)

## 2020-12-04 PROCEDURE — 36415 COLL VENOUS BLD VENIPUNCTURE: CPT

## 2020-12-04 PROCEDURE — 83036 HEMOGLOBIN GLYCOSYLATED A1C: CPT

## 2020-12-04 PROCEDURE — 87205 SMEAR GRAM STAIN: CPT

## 2020-12-04 PROCEDURE — 99213 OFFICE O/P EST LOW 20 MIN: CPT

## 2020-12-04 PROCEDURE — 87075 CULTR BACTERIA EXCEPT BLOOD: CPT

## 2020-12-04 PROCEDURE — 84134 ASSAY OF PREALBUMIN: CPT

## 2020-12-04 PROCEDURE — 87077 CULTURE AEROBIC IDENTIFY: CPT

## 2020-12-04 PROCEDURE — 86403 PARTICLE AGGLUT ANTBDY SCRN: CPT

## 2020-12-04 PROCEDURE — 84155 ASSAY OF PROTEIN SERUM: CPT

## 2020-12-04 PROCEDURE — 87176 TISSUE HOMOGENIZATION CULTR: CPT

## 2020-12-04 PROCEDURE — 11042 DBRDMT SUBQ TIS 1ST 20SQCM/<: CPT | Performed by: NURSE PRACTITIONER

## 2020-12-04 PROCEDURE — 87070 CULTURE OTHR SPECIMN AEROBIC: CPT

## 2020-12-04 PROCEDURE — 82040 ASSAY OF SERUM ALBUMIN: CPT

## 2020-12-04 PROCEDURE — 99203 OFFICE O/P NEW LOW 30 MIN: CPT | Performed by: NURSE PRACTITIONER

## 2020-12-04 PROCEDURE — 11042 DBRDMT SUBQ TIS 1ST 20SQCM/<: CPT

## 2020-12-04 RX ORDER — LIDOCAINE HYDROCHLORIDE 20 MG/ML
JELLY TOPICAL ONCE
Status: CANCELLED | OUTPATIENT
Start: 2020-12-04

## 2020-12-04 RX ORDER — LIDOCAINE 40 MG/G
CREAM TOPICAL ONCE
Status: CANCELLED | OUTPATIENT
Start: 2020-12-04

## 2020-12-04 RX ORDER — LIDOCAINE HYDROCHLORIDE 40 MG/ML
SOLUTION TOPICAL ONCE
Status: COMPLETED | OUTPATIENT
Start: 2020-12-04 | End: 2020-12-04

## 2020-12-04 RX ORDER — LIDOCAINE 50 MG/G
OINTMENT TOPICAL ONCE
Status: CANCELLED | OUTPATIENT
Start: 2020-12-04

## 2020-12-04 RX ORDER — LIDOCAINE HYDROCHLORIDE 40 MG/ML
SOLUTION TOPICAL ONCE
Status: CANCELLED | OUTPATIENT
Start: 2020-12-04

## 2020-12-04 RX ADMIN — LIDOCAINE HYDROCHLORIDE 10 ML: 40 SOLUTION TOPICAL at 14:03

## 2020-12-04 NOTE — PROGRESS NOTES
[address]   Progress Note and Procedure Note      [facility] RECORD NUMBER:  [id number]  AGE: 39 y.o. GENDER: female  : 1975  EPISODE DATE:  2020    Subjective:     Chief Complaint   Patient presents with    Wound Check     left foot         HISTORY of PRESENT ILLNESS HPI     Lester Alvarez is a 39 y.o. female who presents today for wound/ulcer evaluation. History of Wound Context: left ankle non healing surgical wound. Left ankle surgery 10/23/2020 with Dr Shannon Purvis. Cast was removed this week at 6 week follow up visit and incision line has two openings. Xray taken and per patient was good. Wound/Ulcer Pain Timing/Severity: none  Quality of pain: N/A  Severity:  0 / 10   Modifying Factors: None  Associated Signs/Symptoms: none    Ulcer Identification:  Ulcer Type: non-healing surgical  Contributing Factors: decreased mobility and obesity    Wound: N/A        PAST MEDICAL HISTORY        Diagnosis Date    Anxiety     Depression     Fibromyalgia     Wears contact lenses        PAST SURGICAL HISTORY    Past Surgical History:   Procedure Laterality Date    ARTHRODESIS Left 10/23/2020    LEFT SUBTALAR FUSION, CALCANEAL OSTEOTOMY,  SETH- MARY JANE performed by Jenaro Clark MD at [facility] [address] Right     Marker        FAMILY HISTORY    Family History   Problem Relation Age of Onset    Hypertension Mother     High Cholesterol Mother     High Blood Pressure Mother     Alcohol Abuse Mother     Diabetes Father     Other Father     Colon Cancer Brother     Heart Disease Brother     Diabetes Brother     Alcohol Abuse Brother     High Blood Pressure Brother        SOCIAL HISTORY    Social History     Tobacco Use    Smoking status: Never Smoker    Smokeless tobacco: Never Used   Substance Use Topics    Alcohol use: Yes     Alcohol/week: 1.0 standard drinks     Types: 1 Glasses of wine per week    Drug use:  No ALLERGIES    Allergies   Allergen Reactions    Codeine Nausea Only       MEDICATIONS    Current Outpatient Medications on File Prior to Encounter   Medication Sig Dispense Refill    sulfamethoxazole-trimethoprim (BACTRIM DS) 800-160 MG per tablet Take 1 tablet by mouth 2 times daily for 14 days 28 tablet 0    acetaminophen (TYLENOL) 500 MG tablet Take 2 tablets by mouth every 8 hours for 7 days Do not exceed 4g of Acetaminophen in 24 hrs (including all sources) 30 tablet 0    aspirin 325 MG EC tablet Take 1 tablet by mouth daily 42 tablet 0    citalopram (CELEXA) 40 MG tablet Take 40 mg by mouth      gabapentin (NEURONTIN) 300 MG capsule 3 tabs q HS      hydrOXYzine (VISTARIL) 25 MG capsule Take 25 mg by mouth       No current facility-administered medications on file prior to encounter.         REVIEW OF SYSTEMS    Constitutional: negative  Eyes: negative  Ears, nose, mouth, throat, and face: negative  Respiratory: negative  Cardiovascular: negative  Gastrointestinal: negative  Genitourinary:negative  Integument/breast: negative except for left ankle wound  Hematologic/lymphatic: negative  Musculoskeletal:negative  Neurological: negative  Behavioral/Psych: negative  Endocrine: negative  Allergic/Immunologic: negative    Objective:      /80   Pulse 107   Temp 100.2 °F (37.9 °C) (Tympanic)   Resp 16   Ht 5' 7\" (1.702 m)   Wt 216 lb (98 kg)   BMI 33.83 kg/m²     Wt Readings from Last 3 Encounters:   12/04/20 216 lb (98 kg)   12/02/20 237 lb (107.5 kg)   11/20/20 237 lb (107.5 kg)       PHYSICAL EXAM    General Appearance: alert and oriented to person, place and time, well developed and obese, in no acute distress  Skin: warm and dry, no rash or erythema, left ankle wounds  Head: normocephalic and atraumatic  Eyes: pupils equal, round, extraocular eye movements intact, and conjunctivae normal  Pulmonary/Chest: clear to auscultation bilaterally- no wheezes, rales or rhonchi, normal air movement, Debridement Measurements:  Are located in the [facility]  Documentation Flow Sheet    Wound/Ulcer #: 1 and 2    Post Debridement Measurements:  Wound/Ulcer Descriptions are Pre Debridement except measurements:    Wound 12/04/20 Foot Left;Lateral;Distal #1 (Active)   Wound Image   12/04/20 1330   Wound Etiology Non-Healing Surgical 12/04/20 1330   Dressing Status Old drainage noted 12/04/20 1330   Wound Cleansed Irrigated with saline 12/04/20 1330   Wound Length (cm) 0.2 cm 12/04/20 1330   Wound Width (cm) 0.5 cm 12/04/20 1330   Wound Depth (cm) 0.7 cm 12/04/20 1330   Wound Surface Area (cm^2) 0.1 cm^2 12/04/20 1330   Wound Volume (cm^3) 0.07 cm^3 12/04/20 1330   Post-Procedure Length (cm) 0.2 cm 12/04/20 1330   Post-Procedure Width (cm) 0.5 cm 12/04/20 1330   Post-Procedure Depth (cm) 0.7 cm 12/04/20 1330   Post-Procedure Surface Area (cm^2) 0.1 cm^2 12/04/20 1330   Post-Procedure Volume (cm^3) 0.07 cm^3 12/04/20 1330   Wound Assessment Pink/red 12/04/20 1330   Drainage Amount Moderate 12/04/20 1330   Drainage Description Serosanguinous 12/04/20 1330   Odor None 12/04/20 1330   Laisha-wound Assessment Dry/flaky 12/04/20 1330   Margins Defined edges 12/04/20 1330   Wound Thickness Description not for Pressure Injury Full thickness 12/04/20 1330   Number of days: 0       Wound 12/04/20 Foot Left;Lateral;Proximal #2 (Active)   Wound Image   12/04/20 1330   Wound Etiology Non-Healing Surgical 12/04/20 1330   Wound Cleansed Irrigated with saline 12/04/20 1330   Wound Length (cm) 0.5 cm 12/04/20 1330   Wound Width (cm) 1.8 cm 12/04/20 1330   Wound Depth (cm) 0.9 cm 12/04/20 1330   Wound Surface Area (cm^2) 0.9 cm^2 12/04/20 1330   Wound Volume (cm^3) 0.81 cm^3 12/04/20 1330   Post-Procedure Length (cm) 0.5 cm 12/04/20 1330   Post-Procedure Width (cm) 1.8 cm 12/04/20 1330   Post-Procedure Depth (cm) 0.9 cm 12/04/20 1330   Post-Procedure Surface Area (cm^2) 0.9 cm^2 12/04/20 1330   Post-Procedure Volume (cm^3) 0.81 cm^3 12/04/20 1330   Wound Assessment Devitalized tissue;Pink/red;Slough 12/04/20 1330   Drainage Amount Moderate 12/04/20 1330   Drainage Description Serosanguinous 12/04/20 1330   Odor None 12/04/20 1330   Laisha-wound Assessment Dry/flaky 12/04/20 1330   Margins Defined edges 12/04/20 1330   Wound Thickness Description not for Pressure Injury Full thickness 12/04/20 1330   Number of days: 0     Incision 10/23/20 Ankle Left;Lateral (Active)   Number of days: 42       Percent of Wound(s)/Ulcer(s) Debrided: 100%    Total Surface Area Debrided:  1.0 sq cm       Diabetic/Pressure/Non Pressure Ulcers only:  Ulcer: Non-Pressure ulcer, fat layer exposed      Estimated Blood Loss:  Minimal    Hemostasis Achieved:  by pressure    Procedural Pain:  0  / 10     Post Procedural Pain:  0 / 10     Response to treatment:  Well tolerated by patient. Plan:     Treatment Note please see attached Discharge Instructions    Written patient dismissal instructions given to patient and signed by patient or POA.          Discharge Instructions         [address] and [address] [facility]      Visit  Discharge Instructions / Physician Orders  DATE:12/4/20     Home Care:None     SUPPLIES ORDERED THRU:[facility] medical 12/4/20     Wound Location:  left lat heel x2     Cleanse with: Liquid antibacterial soap and water, rinse well      Dressing Orders:  Primary dressing Silvercel      Apply   abd  Wrap with kerlix       secure with  Paper tape and ace wrap               Frequency:  Daily     Additional Orders: Increase protein to diet (meat, cheese, eggs, fish, peanut butter, nuts and beans)  Multivitamin daily  LAB WORK please obtain prior to next visit  Culture done  [facility] CHART []     Smart Device  []     HYPERBARIC TREATMENT-                TREATMENT #     Your next appointment with the [address] [place] [address] is in 1 week                                                                                                   ROOM TYPE   [] CHAIR [] BED   [] EITHER        TIME [] 45 MIN     [] 60 MIN     (Please note your next appointment above and if you are unable to keep, kindly give a 24 hour notice. Thank you.)     If you experience any of the following, please call the [facility] during business hours:  [phone]     * Increase in Pain  * Temperature over 101  * Increase in drainage from your wound  * Drainage with a foul odor  * Bleeding  * Increase in swelling  * Need for compression bandage changes due to slippage, breakthrough drainage. If you need medical attention outside of the business hours of the [facility] please contact your PCP or go to the nearest emergency room. The information contained in the After Visit Summary has been reviewed with me, the patient and/or responsible adult, by my health care provider(s). I had the opportunity to ask questions regarding this information.  I have elected to receive;      []After Visit Summary  [x]Comprehensive Discharge Instruction      Patient signature______________________________________Date:________    Electronically signed by THERON Romo CNP on 12/4/2020 at 1:53 PM    Electronically signed by Chapo Thomas RN on 12/4/2020 at 1:57 PM        Electronically signed by THERON Romo CNP on 12/4/2020 at 2:03 PM

## 2020-12-04 NOTE — PROGRESS NOTES
Post-Procedure Surface Area (cm^2) 0.1 cm^2 12/04/20 1330   Post-Procedure Volume (cm^3) 0.07 cm^3 12/04/20 1330   Wound Assessment Pink/red 12/04/20 1330   Drainage Amount Moderate 12/04/20 1330   Drainage Description Serosanguinous 12/04/20 1330   Odor None 12/04/20 1330   Laisha-wound Assessment Dry/flaky 12/04/20 1330   Margins Defined edges 12/04/20 1330   Wound Thickness Description not for Pressure Injury Full thickness 12/04/20 1330   Number of days: 0       Wound 12/04/20 Foot Left;Lateral;Proximal #2 (Active)   Wound Image   12/04/20 1330   Wound Etiology Non-Healing Surgical 12/04/20 1330   Wound Cleansed Irrigated with saline 12/04/20 1330   Wound Length (cm) 0.5 cm 12/04/20 1330   Wound Width (cm) 1.8 cm 12/04/20 1330   Wound Depth (cm) 0.9 cm 12/04/20 1330   Wound Surface Area (cm^2) 0.9 cm^2 12/04/20 1330   Wound Volume (cm^3) 0.81 cm^3 12/04/20 1330   Post-Procedure Length (cm) 0.5 cm 12/04/20 1330   Post-Procedure Width (cm) 1.8 cm 12/04/20 1330   Post-Procedure Depth (cm) 0.9 cm 12/04/20 1330   Post-Procedure Surface Area (cm^2) 0.9 cm^2 12/04/20 1330   Post-Procedure Volume (cm^3) 0.81 cm^3 12/04/20 1330   Wound Assessment Devitalized tissue;Pink/red;Slough 12/04/20 1330   Drainage Amount Moderate 12/04/20 1330   Drainage Description Serosanguinous 12/04/20 1330   Odor None 12/04/20 1330   Laisha-wound Assessment Dry/flaky 12/04/20 1330   Margins Defined edges 12/04/20 1330   Wound Thickness Description not for Pressure Injury Full thickness 12/04/20 1330   Number of days: 0     Incision 10/23/20 Ankle Left;Lateral (Active)   Number of days: 42       Supplies Requested :      WOUND #: 1 and 2   PRIMARY DRESSING:  [place] with silver pad silvercel   Cover and Secure with: ABD pad kerlix secure with paper tapel     FREQUENCY OF DRESSING CHANGES:  Daily         ADDITIONAL ITEMS:  [] Gloves Small  [x] Gloves Medium [] Gloves Large [] Gloves XLarge  [] Tape 1\" [x] Tape 2\" [] Tape 3\"  [] Medipore Tape  [x] Saline  [] Skin Prep   [] Adhesive Remover   [] Cotton Tip Applicators   [] Other:    Patient Wound(s) Debrided: [x] Yes if yes please add date 12/4/20  [] No    Debribement Type: Excisional/Sharp    Patient currently being seen by Home Health: [] Yes   [x] No    Duration for needed supplies:  []15  [x]30  []60  []90 Days    Electronically signed by Marilyn Roland RN on 12/4/2020 at 2:00 PM     Provider Information:      PROVIDER'S NAME: Cece CRUMP-KATIANA    [facility]-[id number]

## 2020-12-06 LAB
ESTIMATED AVERAGE GLUCOSE: 97 MG/DL
HBA1C MFR BLD: 5 % (ref 4–6)

## 2020-12-09 LAB
CULTURE: ABNORMAL
CULTURE: ABNORMAL
DIRECT EXAM: ABNORMAL
DIRECT EXAM: ABNORMAL
Lab: ABNORMAL
SPECIMEN DESCRIPTION: ABNORMAL

## 2020-12-10 ENCOUNTER — HOSPITAL ENCOUNTER (OUTPATIENT)
Dept: WOUND CARE | Age: 45
Discharge: HOME | End: 2020-12-10
Payer: COMMERCIAL

## 2020-12-10 VITALS
HEIGHT: 67 IN | WEIGHT: 212 LBS | SYSTOLIC BLOOD PRESSURE: 126 MMHG | TEMPERATURE: 98.7 F | RESPIRATION RATE: 16 BRPM | DIASTOLIC BLOOD PRESSURE: 86 MMHG | HEART RATE: 96 BPM | BODY MASS INDEX: 33.27 KG/M2

## 2020-12-10 PROCEDURE — 11042 DBRDMT SUBQ TIS 1ST 20SQCM/<: CPT | Performed by: NURSE PRACTITIONER

## 2020-12-10 PROCEDURE — 11042 DBRDMT SUBQ TIS 1ST 20SQCM/<: CPT

## 2020-12-10 RX ORDER — LIDOCAINE HYDROCHLORIDE 20 MG/ML
JELLY TOPICAL ONCE
Status: CANCELLED | OUTPATIENT
Start: 2020-12-10

## 2020-12-10 RX ORDER — LIDOCAINE HYDROCHLORIDE 40 MG/ML
SOLUTION TOPICAL ONCE
Status: CANCELLED | OUTPATIENT
Start: 2020-12-10

## 2020-12-10 RX ORDER — LIDOCAINE 50 MG/G
OINTMENT TOPICAL ONCE
Status: CANCELLED | OUTPATIENT
Start: 2020-12-10

## 2020-12-10 RX ORDER — LIDOCAINE 40 MG/G
CREAM TOPICAL ONCE
Status: CANCELLED | OUTPATIENT
Start: 2020-12-10

## 2020-12-10 RX ORDER — LIDOCAINE HYDROCHLORIDE 40 MG/ML
SOLUTION TOPICAL ONCE
Status: DISCONTINUED | OUTPATIENT
Start: 2020-12-10 | End: 2020-12-11 | Stop reason: HOSPADM

## 2020-12-18 ENCOUNTER — OFFICE VISIT (OUTPATIENT)
Dept: ORTHOPEDIC SURGERY | Age: 45
End: 2020-12-18

## 2020-12-18 VITALS
HEART RATE: 94 BPM | WEIGHT: 212 LBS | BODY MASS INDEX: 33.27 KG/M2 | HEIGHT: 67 IN | RESPIRATION RATE: 14 BRPM | TEMPERATURE: 97.1 F

## 2020-12-18 PROCEDURE — 99024 POSTOP FOLLOW-UP VISIT: CPT | Performed by: ORTHOPAEDIC SURGERY

## 2020-12-18 NOTE — PROGRESS NOTES
[facility]  [address] [address]  Dept: [phone]    Ambulatory Orthopedic Postoperative Visit     Preoperative Diagnosis:   1. 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 Left ankle anterior ankle impingement  3. Left ankle equinus contracture  4. Fibromyalgia  5. Body mass index is 34.46 kg/m².     Postoperative Diagnosis:   1. Same      Procedures Performed:  (10/23/2020)  1. Left foot subtalar fusion   2. Left calcaneal osteotomy  3. Left percutaneous tendoachilles lengthening, performed through separate incision        SUBJECTIVE:     The patient returns 8 weeks post op from the above stated procedure. Reports doing well overall, reports improved pain, denies fevers/chills/night sweats, calf swelling/pain, chest pain, shortness of breath. No complaints. She has been going to wound care, and using a silver dressing, and reports that her wound has been improving, with decreased drainage. OBJECTIVE:  Pulse 94   Temp 97.1 °F (36.2 °C)   Resp 14   Ht 5' 7\" (1.702 m)   Wt 212 lb (96.2 kg)   BMI 33.20 kg/m²    NAD, resting comfortably  Incisions clean/dry/intact aside from below  Sensation to light touch grossly intact throughout   Warm and well perfused  Grossly neurovascularly intact distally  No signs of infection  -Garrett of the wound with delayed healing--significantly improved in clinical appearance with improved healing, no active drainage/surrounding erythema      RADIOLOGY:   12/18/2020 No new radiology images today. Prior images reviewed for reference. FINDINGS:  Three views (AP, Oblique, and Lateral) of the left foot and 2 views (axial and lateral) of the left calcaneus were obtained in the office today and reviewed, revealing no acute fracture, dislocation, or radioopaque foreign body/tumor. Intact hardware across the calcaneal osteotomy and subtalar joint. No interval displacement with interval healing noted. IMPRESSION: Status post calcaneal osteotomy and subtalar fusion as above    Electronically signed by Karen Welch MD      ASSESSMENT AND PLAN:     8 weeks s/p above, doing well overall; her postoperative course was complicated by itchiness and a maculopapular erythematous rash, which has resolved with time. She now has an area of delayed wound healing at her extensile lateral apex, which is improving with oral antibiotics and wound care. with skin necrosis at the apex of her incision with some superficial appearing wound dehiscence. []  Sutures were removed and steri-strips applied          Precautions:  nonweight bearing x 10 weeks anticipated on the Left lower extremity (we discussed holding off on weightbearing for another 2 weeks to allow 2 more weeks of soft tissue healing, and she agrees with this)             -             []?  Physical Therapy/Home exercises        Immobilization:      []? Splint/Cast          [x]? CAM boot                    []?  Comfortable shoe    []? Other:                DVT ppx:   [x]? Early mobilization              [x]? Medication as prescribed (Aspirin 325 mg PO q Day)                     []?  No chemical ppx needed; mechanical only             -     Pain control:  Medication as prescribed (dosing and quantity) indicated for acute postoperative pain control             -     Special concerns:       []?   Vitamin D -- due to relatively low level, the patient will take supplementation                           []?  Prealbumin -- due to relatively low level, the patient will take supplementation []?  Glucose control            []? Tobacco cessation     []? Pin pulling in office at 6 weeks anticipated    []? Follow up on pathology results    []? Follow up on culture results    []?            [x]? Avoid strenuous activity/pain provoking maneuvers and high-impact repetitive exercises     -She will continue following up with the wound care clinic, as her wound has improved significantly since her last visit    All questions were answered and the patient agrees with the above plan. The patient will return to clinic in 2 weeks without x-rays for another wound check. At her next visit, depending on the appearance of her wound, we will hopefully begin her weightbearing advance. Return in about 2 weeks (around 1/1/2021). No orders of the defined types were placed in this encounter. No orders of the defined types were placed in this encounter. Kathrine Seymour MD  Orthopedic Surgery        Please excuse any typos/errors, as this note was created with the assistance of voice recognition software. While intending to generate a document that actually reflects the content of the visit, the document can still have some errors including those of syntax and sound-a-like substitutions which may escape proof reading. In such instances, actual meaning can be extrapolated by context.

## 2020-12-22 ENCOUNTER — HOSPITAL ENCOUNTER (OUTPATIENT)
Dept: WOUND CARE | Age: 45
Discharge: HOME | End: 2020-12-22
Payer: COMMERCIAL

## 2020-12-22 VITALS
RESPIRATION RATE: 16 BRPM | TEMPERATURE: 98.9 F | BODY MASS INDEX: 32.96 KG/M2 | DIASTOLIC BLOOD PRESSURE: 87 MMHG | HEIGHT: 67 IN | SYSTOLIC BLOOD PRESSURE: 125 MMHG | WEIGHT: 210 LBS

## 2020-12-22 PROBLEM — S91.002D ANKLE WOUND, LEFT, SUBSEQUENT ENCOUNTER: Status: ACTIVE | Noted: 2020-12-04

## 2020-12-22 PROCEDURE — 11042 DBRDMT SUBQ TIS 1ST 20SQCM/<: CPT

## 2020-12-22 PROCEDURE — 11042 DBRDMT SUBQ TIS 1ST 20SQCM/<: CPT | Performed by: NURSE PRACTITIONER

## 2020-12-22 RX ORDER — LIDOCAINE 40 MG/G
CREAM TOPICAL ONCE
Status: CANCELLED | OUTPATIENT
Start: 2020-12-22 | End: 2020-12-22

## 2020-12-22 RX ORDER — LIDOCAINE HYDROCHLORIDE 20 MG/ML
JELLY TOPICAL ONCE
Status: CANCELLED | OUTPATIENT
Start: 2020-12-22 | End: 2020-12-22

## 2020-12-22 RX ORDER — LIDOCAINE HYDROCHLORIDE 40 MG/ML
SOLUTION TOPICAL ONCE
Status: COMPLETED | OUTPATIENT
Start: 2020-12-22 | End: 2020-12-22

## 2020-12-22 RX ORDER — LIDOCAINE 50 MG/G
OINTMENT TOPICAL ONCE
Status: CANCELLED | OUTPATIENT
Start: 2020-12-22 | End: 2020-12-22

## 2020-12-22 RX ORDER — M-VIT,TX,IRON,MINS/CALC/FOLIC 27MG-0.4MG
1 TABLET ORAL DAILY
COMMUNITY

## 2020-12-22 RX ORDER — LIDOCAINE HYDROCHLORIDE 40 MG/ML
SOLUTION TOPICAL ONCE
Status: CANCELLED | OUTPATIENT
Start: 2020-12-22 | End: 2020-12-22

## 2020-12-22 RX ADMIN — LIDOCAINE HYDROCHLORIDE 5 ML: 40 SOLUTION TOPICAL at 14:13

## 2020-12-22 NOTE — PROGRESS NOTES
[address]   Progress Note and Procedure Note      [facility] RECORD NUMBER:  [id number]  AGE: 39 y.o. GENDER: female  : 1975  EPISODE DATE:  2020    Subjective:     Chief Complaint   Patient presents with    Wound Check         HISTORY of PRESENT ILLNESS HPI     Ryne Hidalgo is a 39 y.o. female who presents today for wound/ulcer evaluation. History of Wound Context: left ankle non healing surgical wound after left ankle surgery 10/23/2020 with Dr Caren Gosselin   Wound/Ulcer Pain Timing/Severity: none  Quality of pain: N/A  Severity:  0 / 10   Modifying Factors: None  Associated Signs/Symptoms: none    Ulcer Identification:  Ulcer Type: non-healing surgical  Contributing Factors: decreased mobility and obesity    Wound: N/A        PAST MEDICAL HISTORY        Diagnosis Date    Anxiety     Depression     Fibromyalgia     Wears contact lenses        PAST SURGICAL HISTORY    Past Surgical History:   Procedure Laterality Date    ARTHRODESIS Left 10/23/2020    LEFT SUBTALAR FUSION, CALCANEAL OSTEOTOMY,  SETH- MARY JANE performed by Almas Francisco MD at [facility] [address]     Marker        FAMILY HISTORY    Family History   Problem Relation Age of Onset    Hypertension Mother     High Cholesterol Mother     High Blood Pressure Mother     Alcohol Abuse Mother     Diabetes Father     Other Father     Colon Cancer Brother     Heart Disease Brother     Diabetes Brother     Alcohol Abuse Brother     High Blood Pressure Brother        SOCIAL HISTORY    Social History     Tobacco Use    Smoking status: Never Smoker    Smokeless tobacco: Never Used   Substance Use Topics    Alcohol use:  Yes     Alcohol/week: 1.0 standard drinks     Types: 1 Glasses of wine per week    Drug use: No       ALLERGIES    Allergies   Allergen Reactions    Codeine Nausea Only       MEDICATIONS Current Outpatient Medications on File Prior to Encounter   Medication Sig Dispense Refill    Multiple Vitamins-Minerals (THERAPEUTIC MULTIVITAMIN-MINERALS) tablet Take 1 tablet by mouth daily      acetaminophen (TYLENOL) 500 MG tablet Take 2 tablets by mouth every 8 hours for 7 days Do not exceed 4g of Acetaminophen in 24 hrs (including all sources) 30 tablet 0    aspirin 325 MG EC tablet Take 1 tablet by mouth daily 42 tablet 0    citalopram (CELEXA) 40 MG tablet Take 40 mg by mouth      gabapentin (NEURONTIN) 300 MG capsule 3 tabs q HS      hydrOXYzine (VISTARIL) 25 MG capsule Take 25 mg by mouth       No current facility-administered medications on file prior to encounter.         REVIEW OF SYSTEMS    Constitutional: negative  Eyes: negative  Ears, nose, mouth, throat, and face: negative  Respiratory: negative  Cardiovascular: negative  Gastrointestinal: negative  Genitourinary:negative  Integument/breast: negative except for left ankle wound  Hematologic/lymphatic: negative  Musculoskeletal:negative  Neurological: negative  Behavioral/Psych: negative  Endocrine: negative  Allergic/Immunologic: negative    Objective:      /87   Temp 98.9 °F (37.2 °C) (Tympanic)   Resp 16   Ht 5' 7\" (1.702 m)   Wt 210 lb (95.3 kg)   BMI 32.89 kg/m²     Wt Readings from Last 3 Encounters:   12/22/20 210 lb (95.3 kg)   12/18/20 212 lb (96.2 kg)   12/10/20 212 lb (96.2 kg)       PHYSICAL EXAM    General Appearance: alert and oriented to person, place and time, well-developed and obese, in no acute distress  Skin: warm and dry, no rash or erythema, left ankle wound  Head: normocephalic and atraumatic  Eyes: pupils equal, round, extraocular eye movements intact, conjunctivae normal  Pulmonary/Chest: normal air movement, no respiratory distress  Extremities: no cyanosis and no clubbing  Musculoskeletal: no joint swelling, deformity or tenderness Neurologic: using knee walker- non weight bearing to left leg, coordination normal and speech normal      Assessment:     Problem List Items Addressed This Visit     Non-healing non-surgical wound - Primary    Relevant Orders    Supply: Wound Cleanser    Supply: Wound Dressings    Supply: Pack Wound    Supply: Cover and Secure    Supply: Edema Control    Ankle wound, left, initial encounter    Relevant Orders    Supply: Wound Cleanser    Supply: Wound Dressings    Supply: Pack Wound    Supply: Cover and Secure    Supply: Edema Control    Class 1 obesity due to excess calories without serious comorbidity with body mass index (BMI) of 33.0 to 33.9 in adult    Relevant Orders    Supply: Wound Cleanser    Supply: Wound Dressings    Supply: Pack Wound    Supply: Cover and Secure    Supply: Edema Control           Procedure Note  Indications:  Based on my examination of this patient's wound(s)/ulcer(s) today, debridement is required to promote healing and evaluate the wound base. Performed by: THERON Warren CNP    Consent obtained:  Yes    Time out taken:  Yes    Pain Control: Anesthetic  Anesthetic: 4% Lidocaine Liquid Topical       Debridement:Excisional Debridement    Using curette, scissors and forceps the wound(s)/ulcer(s) was/were sharply debrided down through and including the removal of subcutaneous tissue. Devitalized Tissue Debrided:  fibrin, biofilm and slough    Pre Debridement Measurements:  Are located in the [facility]  Documentation Flow Sheet    Wound/Ulcer #: 2    Post Debridement Measurements:  Wound/Ulcer Descriptions are Pre Debridement except measurements:    Wound 12/04/20 Foot Left;Lateral;Distal #1 (Active)   Wound Image   12/04/20 1330   Wound Etiology Non-Healing Surgical 12/22/20 1350   Dressing Status New drainage noted; Old drainage noted 12/10/20 1422   Wound Cleansed Soap and water 12/10/20 1422   Dressing/Treatment Alginate with Ag 12/10/20 1422 Wound Length (cm) 0 cm 12/22/20 1350   Wound Width (cm) 0 cm 12/22/20 1350   Wound Depth (cm) 0 cm 12/22/20 1350   Wound Surface Area (cm^2) 0 cm^2 12/22/20 1350   Change in Wound Size % (l*w) 100 12/22/20 1350   Wound Volume (cm^3) 0 cm^3 12/22/20 1350   Wound Healing % 100 12/22/20 1350   Post-Procedure Length (cm) 0 cm 12/22/20 1350   Post-Procedure Width (cm) 0 cm 12/22/20 1350   Post-Procedure Depth (cm) 0 cm 12/22/20 1350   Post-Procedure Surface Area (cm^2) 0 cm^2 12/22/20 1350   Post-Procedure Volume (cm^3) 0 cm^3 12/22/20 1350   Wound Assessment Bleeding;Pink/red 12/10/20 1422   Drainage Amount Moderate 12/10/20 1422   Drainage Description Serosanguinous 12/10/20 1422   Odor None 12/10/20 1422   Laisha-wound Assessment Blanchable erythema 12/10/20 1422   Margins Defined edges 12/10/20 1422   Wound Thickness Description not for Pressure Injury Full thickness 12/04/20 1330   Number of days: 18       Wound 12/04/20 Foot Left;Lateral;Proximal #2 (Active)   Wound Image   12/04/20 1330   Wound Etiology Non-Healing Surgical 12/22/20 1350   Dressing Status New dressing applied; Intact; Old drainage noted 12/22/20 1350   Wound Cleansed Irrigated with saline 12/22/20 1350   Dressing/Treatment Alginate with Ag 12/10/20 1422   Wound Length (cm) 0.3 cm 12/22/20 1350   Wound Width (cm) 1.4 cm 12/22/20 1350   Wound Depth (cm) 0.5 cm 12/22/20 1350   Wound Surface Area (cm^2) 0.42 cm^2 12/22/20 1350   Change in Wound Size % (l*w) 53.33 12/22/20 1350   Wound Volume (cm^3) 0.21 cm^3 12/22/20 1350   Wound Healing % 74 12/22/20 1350   Post-Procedure Length (cm) 0.3 cm 12/22/20 1350   Post-Procedure Width (cm) 1.4 cm 12/22/20 1350   Post-Procedure Depth (cm) 0.5 cm 12/22/20 1350   Post-Procedure Surface Area (cm^2) 0.42 cm^2 12/22/20 1350   Post-Procedure Volume (cm^3) 0.21 cm^3 12/22/20 1350   Undermining Starts ___ O'Clock 0100 12/22/20 1350   Undermining Ends___ O'Clock 0900 12/22/20 1350 Undermining Maxium Distance (cm) [email or web address] 12/22/20 1350   Wound Assessment Pink/red;Slough 12/22/20 1350   Drainage Amount Moderate 12/22/20 1350   Drainage Description Serosanguinous 12/22/20 1350   Odor None 12/22/20 1350   Laisha-wound Assessment Dry/flaky 12/22/20 1350   Margins Defined edges 12/22/20 1350   Wound Thickness Description not for Pressure Injury Full thickness 12/10/20 1422   Number of days: 18          Percent of Wound(s)/Ulcer(s) Debrided: 100%    Total Surface Area Debrided:  0.42 sq cm       Diabetic/Pressure/Non Pressure Ulcers only:  Ulcer: Non-Pressure ulcer, fat layer exposed      Estimated Blood Loss:  None    Hemostasis Achieved:  not needed    Procedural Pain:  0  / 10     Post Procedural Pain:  0 / 10     Response to treatment:  Well tolerated by patient. Plan:     Treatment Note please see attached Discharge Instructions    Written patient dismissal instructions given to patient and signed by patient or POA. Discharge Instructions         [address]  Visit [facility] Instructions / Physician Orders  DATE:12/22/20     Home Care:None     SUPPLIES ORDERED THRU:[facility] medical 12/4/20     Wound Location:  left lat heel x2     Cleanse with: Liquid antibacterial soap and water, rinse well      Dressing Orders:  Primary dressing  Promogran tuck loosely into wound cover [place]  (do not pack tight)   Apply   abd  Wrap with kerlix       secure with  Paper tape and ace wrap               Frequency:  Daily     Additional Orders: Increase protein to diet (meat, cheese, eggs, fish, peanut butter, nuts and beans)  Multivitamin daily     MY CHART []? ?     Smart Device  []? ?      HYPERBARIC TREATMENT-                JVOKMAEPA #     Your next appointment with the [facility] is in 2 week                                                                                                  ROOM TYPE   []? ? CHAIR     []? ? BED   []? ? EITHER        TIME []?? 45 MIN     []? ? 60 MIN     (Please note your next appointment above and if you are unable to keep, kindly give a 24 hour notice. Thank you.)     If you experience any of the following, please call the [facility] during business hours:  [phone]     * Increase in Pain  * Temperature over 101  * Increase in drainage from your wound  * Drainage with a foul odor  * Bleeding  * Increase in swelling  * Need for compression bandage changes due to slippage, breakthrough drainage.     If you need medical attention outside of the business hours of the [facility] please contact your PCP or go to the nearest emergency room.     The information contained in the After Visit Summary has been reviewed with me, the patient and/or responsible adult, by my health care provider(s). I had the opportunity to ask questions regarding this information. I have elected to receive;      []? ? After Visit Summary  [x]? ? Comprehensive Discharge Instruction        Patient signature______________________________________Date:________  Electronically signed by Carney Lombard, RN on 12/22/2020 at 2:02 PM  Electronically signed by THERON Lehman CNP on 12/22/2020 at 2:30 PM          Electronically signed by THERON Lehman CNP on 12/22/2020 at 2:35 PM

## 2020-12-30 ENCOUNTER — OFFICE VISIT (OUTPATIENT)
Dept: ORTHOPEDIC SURGERY | Age: 45
End: 2020-12-30

## 2020-12-30 VITALS
HEIGHT: 67 IN | TEMPERATURE: 97.1 F | WEIGHT: 210 LBS | HEART RATE: 106 BPM | RESPIRATION RATE: 16 BRPM | BODY MASS INDEX: 32.96 KG/M2

## 2020-12-30 DIAGNOSIS — M24.573 EQUINUS CONTRACTURE OF ANKLE: Primary | ICD-10-CM

## 2020-12-30 PROCEDURE — 99024 POSTOP FOLLOW-UP VISIT: CPT | Performed by: ORTHOPAEDIC SURGERY

## 2020-12-30 NOTE — PROGRESS NOTES
FINDINGS:  Three views (AP, Oblique, and Lateral) of the left foot and 2 views (axial and lateral) of the left calcaneus were obtained in the office today and reviewed, revealing no acute fracture, dislocation, or radioopaque foreign body/tumor. Intact hardware across the calcaneal osteotomy and subtalar joint. No interval displacement with interval healing noted. IMPRESSION: Status post calcaneal osteotomy and subtalar fusion as above    Electronically signed by Anya Wilson MD      ASSESSMENT AND PLAN:     10 weeks s/p above, doing well overall; her postoperative course was initially complicated by itchiness and a maculopapular erythematous rash, which has resolved with time. She then developed an area of delayed wound healing at the apex of the extensile lateral incision, which has been improving with oral antibiotics and wound care. []  Sutures were removed and steri-strips applied          Precautions:  nonweight bearing x 10 weeks anticipated on the Left lower extremity (she has not been strictly adhering to the weightbearing restriction, we did discuss the importance of this, and I encouraged her to continue to follow the appropriate postoperative weightbearing protocols. She was provided information on this as well today, and we discussed this in detail, and went over the relevant portions)               [x]? Physical Therapy/Home exercises        Immobilization:      []? Splint/Cast          [x]? CAM boot                    []?  Comfortable shoe    []? Other:                DVT ppx:   [x]? Early mobilization              []?  Medication as prescribed (Aspirin 325 mg PO q Day)                     [x]?   No chemical ppx needed; mechanical only             -At this point, she reports she is very mobile, and it does not seem as though she needs any further DVT prophylaxis at this point    Pain control:  Medication as prescribed (dosing and quantity) indicated for acute postoperative pain control             -     Special concerns:       []?            [x]? Avoid strenuous activity/pain provoking maneuvers and high-impact repetitive exercises     -She will continue following up with the wound care clinic, as her wound is continuing to improve    All questions were answered and the patient agrees with the above plan. The patient will return to clinic in 2 weeks with left ankle/foot/calcaneus x-rays, simulated weightbearing           Return in about 2 weeks (around 1/13/2021). No orders of the defined types were placed in this encounter. No orders of the defined types were placed in this encounter. Omer Berry MD  Orthopedic Surgery        Please excuse any typos/errors, as this note was created with the assistance of voice recognition software. While intending to generate a document that actually reflects the content of the visit, the document can still have some errors including those of syntax and sound-a-like substitutions which may escape proof reading. In such instances, actual meaning can be extrapolated by context.

## 2021-01-05 ENCOUNTER — HOSPITAL ENCOUNTER (OUTPATIENT)
Dept: WOUND CARE | Age: 46
Discharge: HOME | End: 2021-01-05
Payer: COMMERCIAL

## 2021-01-05 VITALS
SYSTOLIC BLOOD PRESSURE: 134 MMHG | WEIGHT: 210 LBS | HEIGHT: 67 IN | TEMPERATURE: 99.7 F | HEART RATE: 99 BPM | RESPIRATION RATE: 18 BRPM | DIASTOLIC BLOOD PRESSURE: 86 MMHG | BODY MASS INDEX: 32.96 KG/M2

## 2021-01-05 DIAGNOSIS — S91.002A ANKLE WOUND, LEFT, INITIAL ENCOUNTER: ICD-10-CM

## 2021-01-05 DIAGNOSIS — E66.09 CLASS 1 OBESITY DUE TO EXCESS CALORIES WITHOUT SERIOUS COMORBIDITY WITH BODY MASS INDEX (BMI) OF 33.0 TO 33.9 IN ADULT: ICD-10-CM

## 2021-01-05 DIAGNOSIS — S91.002D ANKLE WOUND, LEFT, SUBSEQUENT ENCOUNTER: ICD-10-CM

## 2021-01-05 DIAGNOSIS — T14.8XXA NON-HEALING NON-SURGICAL WOUND: Primary | ICD-10-CM

## 2021-01-05 PROCEDURE — 11042 DBRDMT SUBQ TIS 1ST 20SQCM/<: CPT

## 2021-01-05 PROCEDURE — 11042 DBRDMT SUBQ TIS 1ST 20SQCM/<: CPT | Performed by: NURSE PRACTITIONER

## 2021-01-05 RX ORDER — LIDOCAINE 50 MG/G
OINTMENT TOPICAL ONCE
Status: CANCELLED | OUTPATIENT
Start: 2021-01-05 | End: 2021-01-05

## 2021-01-05 RX ORDER — LIDOCAINE HYDROCHLORIDE 20 MG/ML
JELLY TOPICAL ONCE
Status: CANCELLED | OUTPATIENT
Start: 2021-01-05 | End: 2021-01-05

## 2021-01-05 RX ORDER — LIDOCAINE 40 MG/G
CREAM TOPICAL ONCE
Status: CANCELLED | OUTPATIENT
Start: 2021-01-05 | End: 2021-01-05

## 2021-01-05 RX ORDER — LIDOCAINE HYDROCHLORIDE 40 MG/ML
SOLUTION TOPICAL ONCE
Status: CANCELLED | OUTPATIENT
Start: 2021-01-05 | End: 2021-01-05

## 2021-01-05 RX ORDER — LIDOCAINE HYDROCHLORIDE 40 MG/ML
SOLUTION TOPICAL ONCE
Status: COMPLETED | OUTPATIENT
Start: 2021-01-05 | End: 2021-01-05

## 2021-01-05 RX ADMIN — LIDOCAINE HYDROCHLORIDE: 40 SOLUTION TOPICAL at 15:21

## 2021-01-05 NOTE — PROGRESS NOTES
[address]   Progress Note and Procedure Note      [facility] RECORD NUMBER:  [id number]  AGE: 39 y.o. GENDER: female  : 1975  EPISODE DATE:  2021    Subjective:     Chief Complaint   Patient presents with    Wound Check     left lateral ankle         HISTORY of PRESENT ILLNESS HPI     Fortino Branch is a 39 y.o. female who presents today for wound/ulcer evaluation. History of Wound Context: here for follow up on non healing surgical wound to left ankle after left ankle surgery 10/23/2020 with Dr Cris Chamorro   Wound/Ulcer Pain Timing/Severity: none  Quality of pain: N/A  Severity:  0 / 10   Modifying Factors: None  Associated Signs/Symptoms: none    Ulcer Identification:  Ulcer Type: non-healing surgical  Contributing Factors: decreased mobility and obesity    Wound: N/A        PAST MEDICAL HISTORY        Diagnosis Date    Anxiety     Depression     Fibromyalgia     Wears contact lenses        PAST SURGICAL HISTORY    Past Surgical History:   Procedure Laterality Date    ARTHRODESIS Left 10/23/2020    LEFT SUBTALAR FUSION, CALCANEAL OSTEOTOMY,  SETH- MARY JANE performed by Karla Ramos MD at [facility] [address]     [address]        FAMILY HISTORY    Family History   Problem Relation Age of Onset    Hypertension Mother     High Cholesterol Mother     High Blood Pressure Mother     Alcohol Abuse Mother     Diabetes Father     Other Father     Colon Cancer Brother     Heart Disease Brother     Diabetes Brother     Alcohol Abuse Brother     High Blood Pressure Brother        SOCIAL HISTORY    Social History     Tobacco Use    Smoking status: Never Smoker    Smokeless tobacco: Never Used   Substance Use Topics    Alcohol use:  Yes     Alcohol/week: 1.0 standard drinks     Types: 1 Glasses of wine per week    Drug use: No       ALLERGIES    Allergies   Allergen Reactions    Codeine Nausea Only       MEDICATIONS Current Outpatient Medications on File Prior to Encounter   Medication Sig Dispense Refill    Multiple Vitamins-Minerals (THERAPEUTIC MULTIVITAMIN-MINERALS) tablet Take 1 tablet by mouth daily      acetaminophen (TYLENOL) 500 MG tablet Take 2 tablets by mouth every 8 hours for 7 days Do not exceed 4g of Acetaminophen in 24 hrs (including all sources) 30 tablet 0    aspirin 325 MG EC tablet Take 1 tablet by mouth daily 42 tablet 0    citalopram (CELEXA) 40 MG tablet Take 40 mg by mouth      gabapentin (NEURONTIN) 300 MG capsule 3 tabs q HS      hydrOXYzine (VISTARIL) 25 MG capsule Take 25 mg by mouth       No current facility-administered medications on file prior to encounter.         REVIEW OF SYSTEMS    Constitutional: negative  Eyes: negative  Ears, nose, mouth, throat, and face: negative  Respiratory: negative  Cardiovascular: negative  Gastrointestinal: negative  Genitourinary:negative  Integument/breast: negative except for left ankle wound  Hematologic/lymphatic: negative  Musculoskeletal:negative  Neurological: negative  Behavioral/Psych: negative  Endocrine: negative  Allergic/Immunologic: negative    Objective:      /86   Pulse 99   Temp 99.7 °F (37.6 °C) (Tympanic)   Resp 18   Ht 5' 7\" (1.702 m)   Wt 210 lb (95.3 kg)   BMI 32.89 kg/m²     Wt Readings from Last 3 Encounters:   01/05/21 210 lb (95.3 kg)   12/30/20 210 lb (95.3 kg)   12/22/20 210 lb (95.3 kg)       PHYSICAL EXAM    General Appearance: alert and oriented to person, place and time, well-developed and obese, in no acute distress  Skin: warm and dry, no rash or erythema, left ankle wound   Head: normocephalic and atraumatic  Eyes: pupils equal, round, extraocular eye movements intact, and conjunctivae normal  Pulmonary/Chest: normal air movement, no respiratory distress  Extremities: no cyanosis and no clubbing  Musculoskeletal: no joint swelling, deformity or tenderness Neurologic: using knee walker - non weight bearing to left leg, coordination normal and speech normal      Assessment:     Problem List Items Addressed This Visit     Non-healing non-surgical wound - Primary    Relevant Orders    Supply: Wound Cleanser    Supply: Wound Dressings    Supply: Pack Wound    Supply: Cover and Secure    Supply: Edema Control    Ankle wound, left, initial encounter    Relevant Orders    Supply: Wound Cleanser    Supply: Wound Dressings    Supply: Pack Wound    Supply: Cover and Secure    Supply: Edema Control    Class 1 obesity due to excess calories without serious comorbidity with body mass index (BMI) of 33.0 to 33.9 in adult    Relevant Orders    Supply: Wound Cleanser    Supply: Wound Dressings    Supply: Pack Wound    Supply: Cover and Secure    Supply: Edema Control           Procedure Note  Indications:  Based on my examination of this patient's wound(s)/ulcer(s) today, debridement is required to promote healing and evaluate the wound base. Performed by: THERON Claire CNP    Consent obtained:  Yes    Time out taken:  Yes    Pain Control: Anesthetic  Anesthetic: 4% Lidocaine Liquid Topical       Debridement:Excisional Debridement    Using curette the wound(s)/ulcer(s) was/were sharply debrided down through and including the removal of subcutaneous tissue. Devitalized Tissue Debrided:  fibrin, biofilm and slough    Pre Debridement Measurements:  Are located in the [facility]  Documentation Flow Sheet    Wound/Ulcer #: 2    Post Debridement Measurements:  Wound/Ulcer Descriptions are Pre Debridement except measurements:    Wound 12/04/20 Foot Left;Lateral;Proximal #2 (Active)   Wound Image   12/04/20 1330   Wound Etiology Non-Healing Surgical 01/05/21 1519   Dressing Status New drainage noted; Old drainage noted 01/05/21 1519   Wound Cleansed Irrigated with saline 01/05/21 1519   Dressing/Treatment Alginate with Ag 12/10/20 1422 Cleanse with: Liquid antibacterial soap and water, rinse well      Dressing Orders:  Primary dressing  Fibracol  (do not pack tight)   Apply    Wrap with kerlix       secure with  Paper tape and ace wrap               Frequency:  Daily     Additional Orders: Increase protein to diet (meat, cheese, eggs, fish, peanut butter, nuts and beans)  Multivitamin daily     MY CHART []???     Smart Device  []???      HYPERBARIC TREATMENT-                TREATMENT #     Your next appointment with the [address] [place] [address] is in 2 week                                                                                                   ROOM TYPE   []? ?? CHAIR     []? ?? BED   []? ?? EITHER        TIME []??? 45 MIN     []? ?? 60 MIN     (Please note your next appointment above and if you are unable to keep, kindly give a 24 hour notice. Thank you.)     If you experience any of the following, please call the [address] [place] [address] during business hours:  [phone]     * Increase in Pain  * Temperature over 101  * Increase in drainage from your wound  * Drainage with a foul odor  * Bleeding  * Increase in swelling  * Need for compression bandage changes due to slippage, breakthrough drainage.     If you need medical attention outside of the business hours of the [address] [place] [address] please contact your PCP or go to the nearest emergency room.     The information contained in the After Visit Summary has been reviewed with me, the patient and/or responsible adult, by my health care provider(s). I had the opportunity to ask questions regarding this information. I have elected to receive;      []? ? ? After Visit Summary  [x]? ?? Comprehensive Discharge Instruction        Patient signature______________________________________Date:________  Electronically signed by Steve Keita RN on 1/5/2021 at 3:41 PM  Electronically signed by THERON Phillips CNP on 1/5/2021 at 3:46 PM Electronically signed by THERON Royal CNP on 1/5/2021 at 3:47 PM

## 2021-01-14 DIAGNOSIS — M19.079 ARTHRITIS OF SUBTALAR JOINT: Primary | ICD-10-CM

## 2021-01-15 ENCOUNTER — OFFICE VISIT (OUTPATIENT)
Dept: ORTHOPEDIC SURGERY | Age: 46
End: 2021-01-15

## 2021-01-15 VITALS
HEIGHT: 67 IN | BODY MASS INDEX: 32.96 KG/M2 | WEIGHT: 210 LBS | RESPIRATION RATE: 15 BRPM | TEMPERATURE: 97.3 F | HEART RATE: 86 BPM

## 2021-01-15 DIAGNOSIS — M25.872 IMPINGEMENT SYNDROME OF LEFT ANKLE: Primary | ICD-10-CM

## 2021-01-15 DIAGNOSIS — M19.079 ARTHRITIS OF SUBTALAR JOINT: Primary | ICD-10-CM

## 2021-01-15 DIAGNOSIS — T14.8XXD DELAYED WOUND HEALING: ICD-10-CM

## 2021-01-15 DIAGNOSIS — M24.573 EQUINUS CONTRACTURE OF ANKLE: ICD-10-CM

## 2021-01-15 PROCEDURE — 99024 POSTOP FOLLOW-UP VISIT: CPT | Performed by: ORTHOPAEDIC SURGERY

## 2021-01-15 NOTE — PROGRESS NOTES
[facility]  [address] [address]  Dept: [phone]    Ambulatory Orthopedic Postoperative Visit     Preoperative Diagnosis:   1. 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 Left ankle anterior ankle impingement  3. Left ankle equinus contracture  4. Fibromyalgia  5. Body mass index is 34.46 kg/m².     Postoperative Diagnosis:   1. Same      Procedures Performed:  (10/23/2020)  1. Left foot subtalar fusion   2. Left calcaneal osteotomy  3. Left percutaneous tendoachilles lengthening, performed through separate incision        SUBJECTIVE:     The patient returns post op from the above stated procedure. Reports doing well overall, reports improved pain, denies fevers/chills/night sweats, calf swelling/pain, chest pain, shortness of breath. No complaints. She has been going to wound care, and using a silver dressing, and reports that her wound has been improving, with minimal drainage at this point. OBJECTIVE:  Pulse 86   Temp 97.3 °F (36.3 °C)   Resp 15   Ht 5' 7\" (1.702 m)   Wt 210 lb (95.3 kg)   BMI 32.89 kg/m²    NAD, resting comfortably  Incisions clean/dry/intact aside from below  Sensation to light touch grossly intact throughout   Warm and well perfused  Grossly neurovascularly intact distally  No signs of infection  -[place] of the wound with delayed healing (approximately 1 cm to 1.5 cm in length)-- improved in clinical appearance with improved healing, no active drainage/surrounding erythema. No drainage on dressing.       RADIOLOGY: 1/15/2021 FINDINGS:  Three views (AP, Oblique, and Lateral) of the left foot and 2 views (axial and lateral) of the left calcaneus were obtained in the office today and reviewed, revealing no acute fracture, dislocation, or radioopaque foreign body/tumor. Intact hardware across the calcaneal osteotomy and subtalar joint. No interval displacement with interval healing noted. IMPRESSION: Status post calcaneal osteotomy and subtalar fusion as above    Electronically signed by Melida Mauro MD      ASSESSMENT AND PLAN:     12 weeks s/p above, doing well overall; her postoperative course was initially complicated by itchiness and a maculopapular erythematous rash, which has resolved with time. She then developed an area of delayed wound healing at the apex of the extensile lateral incision, which has been improving with oral antibiotics and wound care. Precautions:   Begin weightbearing advance protocol, this was printed and provided today, and the relevant portions were discussed with the patient. -             [x]? Physical Therapy/Home exercises        Immobilization:      []? Splint/Cast          [x]? CAM boot                    []?  Comfortable shoe    []? Other:                DVT ppx:   [x]? Early mobilization                         [x]? No chemical ppx needed; mechanical only              [x]? Avoid strenuous activity/pain provoking maneuvers and high-impact repetitive exercises     -She will continue following up with the wound care clinic, as her wound is continuing to improve    All questions were answered and the patient agrees with the above plan. The patient will return to clinic in 6 weeks with repeat foot and calcaneus x-rays           Return in about 6 weeks (around 2/26/2021). No orders of the defined types were placed in this encounter. No orders of the defined types were placed in this encounter.         Michael Curtis MD  Orthopedic Surgery Please excuse any typos/errors, as this note was created with the assistance of voice recognition software. While intending to generate a document that actually reflects the content of the visit, the document can still have some errors including those of syntax and sound-a-like substitutions which may escape proof reading. In such instances, actual meaning can be extrapolated by context.

## 2021-01-19 ENCOUNTER — HOSPITAL ENCOUNTER (OUTPATIENT)
Dept: WOUND CARE | Age: 46
Discharge: HOME | End: 2021-01-19
Payer: COMMERCIAL

## 2021-01-19 VITALS
WEIGHT: 210 LBS | RESPIRATION RATE: 16 BRPM | HEIGHT: 67 IN | SYSTOLIC BLOOD PRESSURE: 133 MMHG | DIASTOLIC BLOOD PRESSURE: 80 MMHG | HEART RATE: 94 BPM | TEMPERATURE: 100 F | BODY MASS INDEX: 32.96 KG/M2

## 2021-01-19 DIAGNOSIS — T14.8XXA NON-HEALING NON-SURGICAL WOUND: ICD-10-CM

## 2021-01-19 DIAGNOSIS — E66.09 CLASS 1 OBESITY DUE TO EXCESS CALORIES WITHOUT SERIOUS COMORBIDITY WITH BODY MASS INDEX (BMI) OF 33.0 TO 33.9 IN ADULT: ICD-10-CM

## 2021-01-19 DIAGNOSIS — S91.002D ANKLE WOUND, LEFT, SUBSEQUENT ENCOUNTER: Primary | ICD-10-CM

## 2021-01-19 PROCEDURE — 99212 OFFICE O/P EST SF 10 MIN: CPT

## 2021-01-19 PROCEDURE — 99213 OFFICE O/P EST LOW 20 MIN: CPT | Performed by: NURSE PRACTITIONER

## 2021-01-19 RX ORDER — LIDOCAINE 50 MG/G
OINTMENT TOPICAL ONCE
Status: CANCELLED | OUTPATIENT
Start: 2021-01-19 | End: 2021-01-19

## 2021-01-19 RX ORDER — LIDOCAINE HYDROCHLORIDE 40 MG/ML
SOLUTION TOPICAL ONCE
Status: CANCELLED | OUTPATIENT
Start: 2021-01-19 | End: 2021-01-19

## 2021-01-19 RX ORDER — LIDOCAINE HYDROCHLORIDE 20 MG/ML
JELLY TOPICAL ONCE
Status: CANCELLED | OUTPATIENT
Start: 2021-01-19 | End: 2021-01-19

## 2021-01-19 RX ORDER — LIDOCAINE HYDROCHLORIDE 40 MG/ML
SOLUTION TOPICAL ONCE
Status: DISCONTINUED | OUTPATIENT
Start: 2021-01-19 | End: 2021-01-20 | Stop reason: HOSPADM

## 2021-01-19 RX ORDER — LIDOCAINE 40 MG/G
CREAM TOPICAL ONCE
Status: CANCELLED | OUTPATIENT
Start: 2021-01-19 | End: 2021-01-19

## 2021-01-19 NOTE — PROGRESS NOTES
[address]   Progress Note and Procedure Note      [facility] RECORD NUMBER:  [id number]  AGE: 39 y.o. GENDER: female  : 1975  EPISODE DATE:  2021    Subjective:     Chief Complaint   Patient presents with    Wound Check     left lateral ankle         HISTORY of PRESENT ILLNESS HPI     Sang Lawson is a 39 y.o. female who presents today for wound/ulcer evaluation. History of Wound Context: here to follow up on non healing surgical wound to left ankle after left ankle surgery 10/23/2020 with Dr Bernadette Haddad   Wound/Ulcer Pain Timing/Severity: none  Quality of pain: N/A  Severity:  0 / 10   Modifying Factors: None  Associated Signs/Symptoms: none    Ulcer Identification:  Ulcer Type: non-healing surgical  Contributing Factors: decreased mobility and obesity    Wound: N/A        PAST MEDICAL HISTORY        Diagnosis Date    Anxiety     Depression     Fibromyalgia     Wears contact lenses        PAST SURGICAL HISTORY    Past Surgical History:   Procedure Laterality Date    ARTHRODESIS Left 10/23/2020    LEFT SUBTALAR FUSION, CALCANEAL OSTEOTOMY,  SETH- MARY JANE performed by Mónica Lobato MD at [facility] Right     Marker        FAMILY HISTORY    Family History   Problem Relation Age of Onset    Hypertension Mother     High Cholesterol Mother     High Blood Pressure Mother     Alcohol Abuse Mother     Diabetes Father     Other Father     Colon Cancer Brother     Heart Disease Brother     Diabetes Brother     Alcohol Abuse Brother     High Blood Pressure Brother        SOCIAL HISTORY    Social History     Tobacco Use    Smoking status: Never Smoker    Smokeless tobacco: Never Used   Substance Use Topics    Alcohol use:  Yes     Alcohol/week: 1.0 standard drinks     Types: 1 Glasses of wine per week    Drug use: No       ALLERGIES    Allergies   Allergen Reactions    Codeine Nausea Only       MEDICATIONS Current Outpatient Medications on File Prior to Encounter   Medication Sig Dispense Refill    Multiple Vitamins-Minerals (THERAPEUTIC MULTIVITAMIN-MINERALS) tablet Take 1 tablet by mouth daily      acetaminophen (TYLENOL) 500 MG tablet Take 2 tablets by mouth every 8 hours for 7 days Do not exceed 4g of Acetaminophen in 24 hrs (including all sources) 30 tablet 0    aspirin 325 MG EC tablet Take 1 tablet by mouth daily 42 tablet 0    citalopram (CELEXA) 40 MG tablet Take 40 mg by mouth      gabapentin (NEURONTIN) 300 MG capsule 3 tabs q HS      hydrOXYzine (VISTARIL) 25 MG capsule Take 25 mg by mouth       No current facility-administered medications on file prior to encounter.         REVIEW OF SYSTEMS    Constitutional: negative  Eyes: negative  Ears, nose, mouth, throat, and face: negative  Respiratory: negative  Cardiovascular: negative  Gastrointestinal: negative  Genitourinary:negative  Integument/breast: negative except for left ankle wound  Hematologic/lymphatic: negative  Musculoskeletal:negative except for using knee walker with left lower leg boot   Neurological: negative  Behavioral/Psych: negative  Endocrine: negative  Allergic/Immunologic: negative    Objective:      /80   Pulse 94   Temp 100 °F (37.8 °C) (Tympanic)   Resp 16   Ht 5' 7\" (1.702 m)   Wt 210 lb (95.3 kg)   BMI 32.89 kg/m²     Wt Readings from Last 3 Encounters:   01/19/21 210 lb (95.3 kg)   01/15/21 210 lb (95.3 kg)   01/05/21 210 lb (95.3 kg)       PHYSICAL EXAM    General Appearance: alert and oriented to person, place and time, well-developed and obese in no acute distress  Skin: warm and dry, no rash or erythema, left ankle wound  Head: normocephalic and atraumatic  Eyes: pupils equal, round, extraocular eye movements intact, and conjunctivae normal  Pulmonary/Chest: normal air movement, no respiratory distress  Extremities: no cyanosis and no clubbing  Musculoskeletal: no joint swelling, deformity or tenderness Neurologic: using knee walker, coordination normal and speech normal      Assessment:     Problem List Items Addressed This Visit     Non-healing non-surgical wound    Relevant Medications    lidocaine (XYLOCAINE) 4 % external solution (Start on 1/19/2021  3:30 PM)    Other Relevant Orders    Supply: Wound Cleanser    Supply: Wound Dressings    Supply: Cover and Secure    Supply: Edema Control    Class 1 obesity due to excess calories without serious comorbidity with body mass index (BMI) of 33.0 to 33.9 in adult    Relevant Medications    lidocaine (XYLOCAINE) 4 % external solution (Start on 1/19/2021  3:30 PM)    Other Relevant Orders    Supply: Wound Cleanser    Supply: Wound Dressings    Supply: Cover and Secure    Supply: Edema Control    Ankle wound, left, subsequent encounter - Primary    Relevant Medications    lidocaine (XYLOCAINE) 4 % external solution (Start on 1/19/2021  3:30 PM)    Other Relevant Orders    Supply: Wound Cleanser    Supply: Wound Dressings    Supply: Cover and Secure    Supply: Edema Control           Procedure Note  Indications:  Based on my examination of this patient's wound(s)/ulcer(s) today, debridement is not required to promote healing and evaluate the wound base. Wound Measurements:  Wound/Ulcer Descriptions are Pre Debridement except measurements:    Wound 12/04/20 Foot Left;Lateral;Proximal #2 (Active)   Wound Image   12/04/20 1330   Wound Etiology Non-Healing Surgical 01/19/21 1518   Dressing Status New drainage noted; Old drainage noted 01/19/21 1518   Wound Cleansed Irrigated with saline 01/19/21 1518   Dressing/Treatment Alginate with Ag 12/10/20 1422   Wound Length (cm) 0 cm 01/19/21 1518   Wound Width (cm) 0 cm 01/19/21 1518   Wound Depth (cm) 0 cm 01/19/21 1518   Wound Surface Area (cm^2) 0 cm^2 01/19/21 1518   Change in Wound Size % (l*w) 100 01/19/21 1518   Wound Volume (cm^3) 0 cm^3 01/19/21 1518   Wound Healing % 100 01/19/21 1518 Post-Procedure Length (cm) 0.1 cm 01/19/21 1518   Post-Procedure Width (cm) 0.1 cm 01/19/21 1518   Post-Procedure Depth (cm) 0.1 cm 01/19/21 1518   Post-Procedure Surface Area (cm^2) 0.01 cm^2 01/19/21 1518   Post-Procedure Volume (cm^3) 0 cm^3 01/19/21 1518   Undermining Starts ___ O'Clock 0100 12/22/20 1350   Undermining Ends___ O'Clock 0900 12/22/20 1350   Undermining Maxium Distance (cm) [email or web address] 01/05/21 1519   Wound Assessment Pink/red 01/05/21 1519   Drainage Amount Moderate 01/05/21 1519   Drainage Description Serosanguinous; Yellow 01/05/21 1519   Odor None 01/05/21 1519   Laisha-wound Assessment Blanchable erythema 01/05/21 1519   Margins Defined edges 01/05/21 1519   Wound Thickness Description not for Pressure Injury Full thickness 12/10/20 1422   Number of days: 46            Plan:     Treatment Note please see attached Discharge Instructions    Written patient dismissal instructions given to patient and signed by patient or POA.          Discharge Instructions         [address] [facility]RE[facility]  CENTER                                 Visit Yuly Instructions / Physician Orders  DATE:1/19/2021     Home Care:None     SUPPLIES ORDERED THRU:[facility] medical 12/4/20     Wound Location:  left lat heel x1     Cleanse with: Liquid antibacterial soap and water, rinse well      Dressing Orders:  Primary dressing  Fibracol  (do not pack tight)   Apply    Wrap with kerlix       secure with  Paper tape and ace wrap               Frequency:  Daily     Additional Orders: Increase protein to diet (meat, cheese, eggs, fish, peanut butter, nuts and beans)  Multivitamin daily     MY CHART []????     Smart Device  []????      HYPERBARIC TREATMENT-                TREATMENT #     Your next appointment with the [address] [place] [address] is in 1 week Virtual Visit                                                                                                  ROOM TYPE   []???? CHAIR     []???? BED   []???? EITHER        TIME []???? 45 MIN     []???? 60 MIN     (Please note your next appointment above and if you are unable to keep, kindly give a 24 hour notice. Thank you.)     If you experience any of the following, please call the [facility] [address] during business hours:  [phone]     * Increase in Pain  * Temperature over 101  * Increase in drainage from your wound  * Drainage with a foul odor  * Bleeding  * Increase in swelling  * Need for compression bandage changes due to slippage, breakthrough drainage.     If you need medical attention outside of the business hours of the [address] please contact your PCP or go to the nearest emergency room.     The information contained in the After Visit Summary has been reviewed with me, the patient and/or responsible adult, by my health care provider(s). I had the opportunity to ask questions regarding this information. I have elected to receive;      []?? ?? After Visit Summary  [x]????Comprehensive Discharge Instruction        Patient signature______________________________________Date:________  Electronically signed by Lori Andre RN on 1/19/2021 at 3:22 PM  Electronically signed by THERON Falcon CNP on 1/19/2021 at 3:23 PM          Electronically signed by THERON Falcon CNP on 1/19/2021 at 3:27 PM

## 2021-01-25 ENCOUNTER — HOSPITAL ENCOUNTER (OUTPATIENT)
Dept: PHYSICAL THERAPY | Facility: CLINIC | Age: 46
Setting detail: THERAPIES SERIES
Discharge: HOME | End: 2021-01-25
Payer: COMMERCIAL

## 2021-01-25 NOTE — FLOWSHEET NOTE
[] [address]  [address]    [phone]  F: [phone]   [] [address] [employer] [facility] [address]   [address]  P: [phone]  F: [phone]  [] [address]  [address] [facility]  P: [phone]  F: [phone] [] [address]  P: [phone]  F: [phone]  [x] [address]  [address] [facility] [address]   [address]   [place]: [phone]  F: [phone]   [] [address]  [place]: [phone]   F: [phone]     Physical Therapy Cancel/No Show note    Date: 2021  Patient: Tobias Smart  : 1975  MRN: [id number]    Cancels/No Shows to date:      For today's appointment patient:    [x]  Cancelled    [] Rescheduled appointment    [] No-show     Reason given by patient:    []  Patient ill    []  Conflicting appointment    [] No transportation      [] Conflict with work    [] No reason given    [] Weather related    [] [id number]    [x] Other:      Comments:  Waiting for approved C-9 form      [] Next appointment was confirmed    Electronically signed by: Yue Barnett

## 2021-01-26 ENCOUNTER — HOSPITAL ENCOUNTER (OUTPATIENT)
Dept: WOUND CARE | Age: 46
Discharge: HOME | End: 2021-01-26
Payer: COMMERCIAL

## 2021-01-26 DIAGNOSIS — E66.09 CLASS 1 OBESITY DUE TO EXCESS CALORIES WITHOUT SERIOUS COMORBIDITY WITH BODY MASS INDEX (BMI) OF 33.0 TO 33.9 IN ADULT: ICD-10-CM

## 2021-01-26 DIAGNOSIS — T14.8XXA NON-HEALING NON-SURGICAL WOUND: ICD-10-CM

## 2021-01-26 DIAGNOSIS — S91.002D ANKLE WOUND, LEFT, SUBSEQUENT ENCOUNTER: Primary | ICD-10-CM

## 2021-01-26 PROCEDURE — 99213 OFFICE O/P EST LOW 20 MIN: CPT | Performed by: NURSE PRACTITIONER

## 2021-01-26 PROCEDURE — 99212 OFFICE O/P EST SF 10 MIN: CPT

## 2021-01-26 NOTE — PROGRESS NOTES
[address]   Progress Note and Procedure Note      [facility] RECORD NUMBER:  [id number]  AGE: 39 y.o. GENDER: female  : 1975  EPISODE DATE:  2021    TELEHEALTH EVALUATION -- Audio/Visual (During XPEOC-65 public health emergency)    HPI:    Donaldo Frost (:  1975) has requested an audio/video evaluation for the following concern(s):     Donaldo Frost is a 39 y.o. female who presents today for wound/ulcer evaluation. History of Wound Context: here to follow up on non healing surgical wound to left ankle after left ankle surgery 10/23/2020 with Dr Ramez Estrada. Wound/Ulcer Pain Timing/Severity: none  Quality of pain: N/A  Severity:  0 / 10   Modifying Factors: None  Associated Signs/Symptoms: none    Ulcer Identification:  Ulcer Type: non-healing surgical  Contributing Factors: decreased mobility and obesity    Wound: N/A    REVIEW OF SYSTEMS    Constitutional: negative  Eyes: negative  Ears, nose, mouth, throat, and face: negative  Respiratory: negative  Cardiovascular: negative  Gastrointestinal: negative  Genitourinary:negative  Integument/breast: negative except for left ankle wound  Hematologic/lymphatic: negative  Musculoskeletal:negative  Neurological: negative  Behavioral/Psych: negative  Endocrine: negative  Allergic/Immunologic: negative    Prior to Visit Medications    Medication Sig Taking?  Authorizing Provider   Multiple Vitamins-Minerals (THERAPEUTIC MULTIVITAMIN-MINERALS) tablet Take 1 tablet by mouth daily  Historical Provider, MD   acetaminophen (TYLENOL) 500 MG tablet Take 2 tablets by mouth every 8 hours for 7 days Do not exceed 4g of Acetaminophen in 24 hrs (including all sources)  Almas Lucas MD   aspirin 325 MG EC tablet Take 1 tablet by mouth daily  Almas Lucas MD   citalopram (CELEXA) 40 MG tablet Take 40 mg by mouth  Historical Provider, MD gabapentin (NEURONTIN) 300 MG capsule 3 tabs q HS  Historical Provider, MD   hydrOXYzine (VISTARIL) 25 MG capsule Take 25 mg by mouth  Historical Provider, MD       Social History     Tobacco Use    Smoking status: Never Smoker    Smokeless tobacco: Never Used   Substance Use Topics    Alcohol use: Yes     Alcohol/week: 1.0 standard drinks     Types: 1 Glasses of wine per week    Drug use: No          PHYSICAL EXAMINATION:  [ INSTRUCTIONS:  \"[x]\" Indicates a positive item  \"[]\" Indicates a negative item  Vital Signs: (As obtained by patient/caregiver or practitioner observation)    Blood pressure-  Heart rate-    Respiratory rate-    Temperature-  Pulse oximetry-     Constitutional: [x] Appears well-developed and well-nourished [x] No apparent distress      [] Abnormal-   Mental status  [x] Alert and awake  [x] Oriented to person/place/time [x]Able to follow commands      Eyes:  EOM    [x]  Normal  [] Abnormal-  Sclera  [x]  Normal  [] Abnormal -         Discharge [x]  None visible  [] Abnormal -    HENT:   [x] Normocephalic, atraumatic.   [] Abnormal   [x] Mouth/Throat: Mucous membranes are moist.     External Ears [x] Normal  [] Abnormal-     Neck: [x] No visualized mass     Pulmonary/Chest: [x] Respiratory effort normal.  [x] No visualized signs of difficulty breathing or respiratory distress        [] Abnormal-      Musculoskeletal:   [] Normal gait with no signs of ataxia         [x] Normal range of motion of neck        [] Abnormal-       Neurological:        [x] No Facial Asymmetry (Cranial nerve 7 motor function) (limited exam to video visit)          [x] No gaze palsy        [] Abnormal-         Skin:        [] No significant exanthematous lesions or discoloration noted on facial skin         [x] Abnormal- full thickness left ankle wound, see documentation below           Psychiatric:       [x] Normal Affect [x] No Hallucinations        [] Abnormal- Other pertinent observable physical exam findings-       Wound 12/04/20 Foot Left;Lateral;Proximal #2 (Active)   Wound Image   12/04/20 1330   Wound Etiology Non-Healing Surgical 01/19/21 1518   Dressing Status New drainage noted; Old drainage noted 01/19/21 1518   Wound Cleansed Irrigated with saline 01/19/21 1518   Dressing/Treatment Alginate with Ag 12/10/20 1422   Wound Length (cm) 0.1 cm 01/26/21 1631   Wound Width (cm) 0.1 cm 01/26/21 1631   Wound Depth (cm) 0.1 cm 01/26/21 1631   Wound Surface Area (cm^2) 0.01 cm^2 01/26/21 1631   Change in Wound Size % (l*w) 98.89 01/26/21 1631   Wound Volume (cm^3) 0 cm^3 01/26/21 1631   Wound Healing % 100 01/26/21 1631   Post-Procedure Length (cm) 0.1 cm 01/26/21 1631   Post-Procedure Width (cm) 0.1 cm 01/26/21 1631   Post-Procedure Depth (cm) 0.1 cm 01/26/21 1631   Post-Procedure Surface Area (cm^2) 0.01 cm^2 01/26/21 1631   Post-Procedure Volume (cm^3) 0 cm^3 01/26/21 1631   Undermining Starts ___ O'Clock 0100 12/22/20 1350   Undermining Ends___ O'Clock 0900 12/22/20 1350   Undermining Maxium Distance (cm) [email or web address] 01/05/21 1519   Wound Assessment Pink/red 01/05/21 1519   Drainage Amount Moderate 01/05/21 1519   Drainage Description Serosanguinous; Yellow 01/05/21 1519   Odor None 01/05/21 1519   Laisha-wound Assessment Blanchable erythema 01/05/21 1519   Margins Defined edges 01/05/21 1519   Wound Thickness Description not for Pressure Injury Full thickness 12/10/20 1422   Number of days: 53            ASSESSMENT/PLAN:  1. Ankle wound, left, subsequent encounter    2. Non-healing non-surgical wound    3.  Class 1 obesity due to excess calories without serious comorbidity with body mass index (BMI) of 33.0 to 33.9 in adult Edgard Chowdhury is a 39 y.o. female being evaluated by a Virtual Visit (video visit) encounter to address concerns as mentioned above. A caregiver was present when appropriate. Due to this being a TeleHealth encounter (During PXSNL-80 public health emergency), evaluation of the following organ systems was limited: Vitals/Constitutional/EENT/Resp/CV/GI//MS/Neuro/Skin/Heme-Lymph-Imm. Pursuant to the emergency declaration under the [address] [place] [address] and the [employer] Act, this Virtual Visit was conducted with patient's (and/or legal guardian's) consent, to reduce the patient's risk of exposure to COVID-19 and provide necessary medical care. The patient (and/or legal guardian) has also been advised to contact this office for worsening conditions or problems, and seek emergency medical treatment and/or call 911 if deemed necessary. Services were provided through a video synchronous discussion virtually to substitute for in-person clinic visit. Patient and provider were located at their individual homes. --RANJIT Montoya, APRN - CNP on 1/26/2021 at 4:32 PM    An electronic signature was used to authenticate this note. Treatment Note please see attached Discharge Instructions    Written patient dismissal instructions given to patient and signed by patient or POA.          Discharge Instructions         [facility] [place] [facility] TREAT[facility]  CENTER                                 Visit Joshua Moss Instructions / Physician Orders  RDCF:5/05/5460  [address] [place] [address] Care:None     SUPPLIES ORDERED THRU:[facility] medical 12/4/20     Wound Location:  left lat heel x1     Cleanse with: Liquid antibacterial soap and water, rinse well      Dressing Orders:  Primary dressing  Fibracol  (do not pack tight)   Apply    Wrap with kerlix       secure with  Paper tape and ace wrap               Frequency: Renetta Hernandez    Additional Orders: Increase protein to diet (meat, cheese, eggs, fish, peanut butter, nuts and beans)  Multivitamin daily     MY CHART []?????     Smart Device  []?????      HYPERBARIC TREATMENT-                TREATMENT #     Your next appointment with the [address] [place] [address] is in 2 weeks Virtual Visit                                                                                                   ROOM TYPE   []????? CHAIR     []????? BED   []????? EITHER        TIME []????? 45 MIN     []????? 60 MIN     (Please note your next appointment above and if you are unable to keep, kindly give a 24 hour notice. Thank you.)     If you experience any of the following, please call the [address] [place] [address] during business hours:  [phone]     * Increase in Pain  * Temperature over 101  * Increase in drainage from your wound  * Drainage with a foul odor  * Bleeding  * Increase in swelling  * Need for compression bandage changes due to slippage, breakthrough drainage.     If you need medical attention outside of the business hours of the [address] [place] [address] please contact your PCP or go to the nearest emergency room.     The information contained in the After Visit Summary has been reviewed with me, the patient and/or responsible adult, by my health care provider(s). I had the opportunity to ask questions regarding this information. I have elected to receive;      []??? ? ? After Visit Summary  [x]??? ? ? Comprehensive Discharge Instruction        Patient signature______________________________________Date:________    Electronically signed by THERON Ronquillo CNP on 1/26/2021 at 4:20 PM  Electronically signed by iLnda Ford RN on 1/26/2021 at 4:32 PM          Electronically signed by THERON Ronquillo CNP on 1/26/2021 at 4:32 PM

## 2021-01-26 NOTE — PROGRESS NOTES
Greater than 6 wound images annotated. Images of the wound(s) is obtained and annotated along with completed description in [address] [place] [address]. []   3     Education Points   No Education completed by nursing staff.    []   0   Patient/caregiver is educated on 1-4 topics. Nursing staff identifies learner, confirms understanding of information (verbal, demonstration, written) and documents details. May include Discharge Instructions/AVS, available documents in My Chart, or Web-based learning. [x]   1   Patient/caregiver is educated on 5-9 topics. Nursing staff identifies learner, confirms understanding of information (verbal, demonstration, written) and documents details. May include Discharge Instructions/AVS, available documents in My Chart, or Web-based learning. []   2   Patient/caregiver is educated on 10 or more topics. Nursing staff identifies learner, confirms understanding of information (verbal, demonstration, written) and documents details. May include Discharge Instructions/AVS, available documents in My Chart, or Web-based learning. []   3     Follow-up Virtual Visit Points   No contact with outside resources made. []   0   Nursing staff contacts 1-2 outside resource. For example, telephone call made to home health, primary care provider, pharmacy, or DME. May include filling out forms and writing letters, arranging transportation, communication with insurance , vendors, etc.  Discharge, instructions and/or After Visit Summary given to patient/caregiver and instructions completed. [x]   1   Nursing staff contacts 3-4 outside resource. For example, telephone calls made to home health, primary care provider, pharmacy, or DME. May include filling out forms and writing letters, arranging transportation, communication with insurance , vendors, etc.  Discharge, instructions and/or After Visit Summary given to patient/caregiver and instructions completed.    []   2 Nursing contacts 5 or more outside resource. For example, telephone calls made to home health, primary care providers, pharmacy, or DME. May include filling out forms and writing letters, arranging transportation, communication with insurance , vendors, etc.  Discharge, instructions and/or After Visit Summary given to patient/caregiver and instructions completed.    []   3     Is this the Patient's First Visit to the [address] [place] [address] Road  NO    Is this Patient Established @ [facility]  Yes             Virtual Visit Clinical Level of Care Wound Care      Points  1-3  Level 1 []     Points  4-5  Level 2 [x]     Points  6-7  Level 3 []     Points  8-9  Level 4 []     Points  10-11  Level 5 []       Electronically signed by Lennox Copas, RN on 1/26/2021 at @NO

## 2021-02-09 ENCOUNTER — HOSPITAL ENCOUNTER (OUTPATIENT)
Dept: WOUND CARE | Age: 46
Discharge: HOME | End: 2021-02-09
Payer: COMMERCIAL

## 2021-02-09 DIAGNOSIS — T14.8XXA NON-HEALING NON-SURGICAL WOUND: ICD-10-CM

## 2021-02-09 DIAGNOSIS — E66.09 CLASS 1 OBESITY DUE TO EXCESS CALORIES WITHOUT SERIOUS COMORBIDITY WITH BODY MASS INDEX (BMI) OF 33.0 TO 33.9 IN ADULT: ICD-10-CM

## 2021-02-09 DIAGNOSIS — S91.002D ANKLE WOUND, LEFT, SUBSEQUENT ENCOUNTER: Primary | ICD-10-CM

## 2021-02-09 PROCEDURE — 99211 OFF/OP EST MAY X REQ PHY/QHP: CPT

## 2021-02-09 PROCEDURE — 99213 OFFICE O/P EST LOW 20 MIN: CPT | Performed by: NURSE PRACTITIONER

## 2021-02-09 NOTE — PROGRESS NOTES
[address]   Progress Note and Procedure Note      [facility] RECORD NUMBER:  [id number]  AGE: 39 y.o. GENDER: female  : 1975  EPISODE DATE:  2021    TELEHEALTH EVALUATION -- Audio/Visual (During RSOLX-02 public health emergency)    HPI:    Rodger Ralph (:  1975) has requested an audio/video evaluation for the following concern(s):     Rodger Ralph is a 39 y.o. female who presents today for wound/ulcer evaluation. History of Wound Context: following up on left ankle non healing surgical wound after left ankle surgery 10/23/2020 with Dr Baibta Miramontes   Wound/Ulcer Pain Timing/Severity: none  Quality of pain: N/A  Severity:  0 / 10   Modifying Factors: None  Associated Signs/Symptoms: none    Ulcer Identification:  Ulcer Type: non-healing surgical  Contributing Factors: decreased mobility and obesity    Wound: N/A    REVIEW OF SYSTEMS    Constitutional: negative  Eyes: negative  Ears, nose, mouth, throat, and face: negative  Respiratory: negative  Cardiovascular: negative  Gastrointestinal: negative  Genitourinary:negative  Integument/breast: negative  Hematologic/lymphatic: negative  Musculoskeletal:negative  Neurological: negative  Behavioral/Psych: negative  Endocrine: negative  Allergic/Immunologic: negative    Prior to Visit Medications    Medication Sig Taking?  Authorizing Provider   Multiple Vitamins-Minerals (THERAPEUTIC MULTIVITAMIN-MINERALS) tablet Take 1 tablet by mouth daily  Historical Provider, MD   acetaminophen (TYLENOL) 500 MG tablet Take 2 tablets by mouth every 8 hours for 7 days Do not exceed 4g of Acetaminophen in 24 hrs (including all sources)  Veronica Millard MD   aspirin 325 MG EC tablet Take 1 tablet by mouth daily  Veronica Millard MD   citalopram (CELEXA) 40 MG tablet Take 40 mg by mouth  Historical Provider, MD   gabapentin (NEURONTIN) 300 MG capsule 3 tabs q HS  Historical Provider, MD hydrOXYzine (VISTARIL) 25 MG capsule Take 25 mg by mouth  Historical Provider, MD       Social History     Tobacco Use    Smoking status: Never Smoker    Smokeless tobacco: Never Used   Substance Use Topics    Alcohol use: Yes     Alcohol/week: 1.0 standard drinks     Types: 1 Glasses of wine per week    Drug use: No          PHYSICAL EXAMINATION:  [ INSTRUCTIONS:  \"[x]\" Indicates a positive item  \"[]\" Indicates a negative item  Vital Signs: (As obtained by patient/caregiver or practitioner observation)    Blood pressure-  Heart rate-    Respiratory rate-    Temperature-  Pulse oximetry-     Constitutional: [x] Appears well-developed and well-nourished [x] No apparent distress      [] Abnormal-   Mental status  [x] Alert and awake  [x] Oriented to person/place/time [x]Able to follow commands      Eyes:  EOM    [x]  Normal  [] Abnormal-  Sclera  [x]  Normal  [] Abnormal -         Discharge [x]  None visible  [] Abnormal -    HENT:   [x] Normocephalic, atraumatic.   [] Abnormal   [x] Mouth/Throat: Mucous membranes are moist.     External Ears [x] Normal  [] Abnormal-     Neck: [x] No visualized mass     Pulmonary/Chest: [x] Respiratory effort normal.  [x] No visualized signs of difficulty breathing or respiratory distress        [] Abnormal-      Musculoskeletal:   [x] Normal gait with no signs of ataxia         [x] Normal range of motion of neck        [] Abnormal-       Neurological:        [x] No Facial Asymmetry (Cranial nerve 7 motor function) (limited exam to video visit)          [x] No gaze palsy        [] Abnormal-         Skin:        [x] No significant exanthematous lesions or discoloration noted on facial skin         [] Abnormal-            Psychiatric:       [x] Normal Affect [x] No Hallucinations        [] Abnormal-     Other pertinent observable physical exam findings-       Wound 12/04/20 Foot Left;Lateral;Proximal #2 (Active)   Wound Image   12/04/20 0290 Wound Etiology Non-Healing Surgical 01/26/21 1631   Dressing Status New drainage noted; Old drainage noted 01/19/21 1518   Wound Cleansed Irrigated with saline 01/19/21 1518   Dressing/Treatment Alginate with Ag 12/10/20 1422   Wound Length (cm) 0 cm 02/09/21 1601   Wound Width (cm) 0 cm 02/09/21 1601   Wound Depth (cm) 0 cm 02/09/21 1601   Wound Surface Area (cm^2) 0 cm^2 02/09/21 1601   Change in Wound Size % (l*w) 100 02/09/21 1601   Wound Volume (cm^3) 0 cm^3 02/09/21 1601   Wound Healing % 100 02/09/21 1601   Post-Procedure Length (cm) 0 cm 02/09/21 1601   Post-Procedure Width (cm) 0 cm 02/09/21 1601   Post-Procedure Depth (cm) 0 cm 02/09/21 1601   Post-Procedure Surface Area (cm^2) 0 cm^2 02/09/21 1601   Post-Procedure Volume (cm^3) 0 cm^3 02/09/21 1601   Undermining Starts ___ O'Clock 0100 12/22/20 1350   Undermining Ends___ O'Clock 0900 12/22/20 1350   Undermining Maxium Distance (cm) [email or web address] 01/05/21 1519   Wound Assessment Pink/red 01/05/21 1519   Drainage Amount Moderate 01/05/21 1519   Drainage Description Serosanguinous; Yellow 01/05/21 1519   Odor None 01/05/21 1519   Laisha-wound Assessment Blanchable erythema 01/05/21 1519   Margins Defined edges 01/05/21 1519   Wound Thickness Description not for Pressure Injury Full thickness 01/26/21 1631   Number of days: 67            ASSESSMENT/PLAN:  1. Ankle wound, left, subsequent encounter    2. Class 1 obesity due to excess calories without serious comorbidity with body mass index (BMI) of 33.0 to 33.9 in adult    3.  Non-healing non-surgical wound Multivitamin daily     MY CHART []??????     Smart Device  []??????      HYPERBARIC TREATMENT-                TREATMENT #     Your next appointment with the [facility] is in [place] [address]                                                                                                   ROOM TYPE   []?????? CHAIR     []?????? BED   []?????? EITHER        TIME []?????? 45 MIN     []?????? 60 MIN     (Please note your next appointment above and if you are unable to keep, kindly give a 24 hour notice. Thank you.)     If you experience any of the following, please call the [facility] during business hours:  [phone]     * Increase in Pain  * Temperature over 101  * Increase in drainage from your wound  * Drainage with a foul odor  * Bleeding  * Increase in swelling  * Need for compression bandage changes due to slippage, breakthrough drainage.     If you need medical attention outside of the business hours of the [facility] please contact your PCP or go to the nearest emergency room.     The information contained in the After Visit Summary has been reviewed with me, the patient and/or responsible adult, by my health care provider(s). I had the opportunity to ask questions regarding this information. I have elected to receive;      []???? ?? After Visit Summary  [x]???? ?? Comprehensive Discharge Instruction        Patient signature______________________________________Date:________  Electronically signed by Cari Lucero RN on 2/9/2021 at 4:03 PM  Electronically signed by THERON Shaikh CNP on 2/9/2021 at 4:03 PM          Electronically signed by THERON Shaikh CNP on 2/9/2021 at 4:03 PM

## 2021-02-09 NOTE — PROGRESS NOTES
Virtual Visit Wound Care  Clinic Level of Care   NAME:  Chelsie Zhang  YOB: 1975 GENDER: female  MEDICAL RECORD NUMBER:  [id number]   DATE:  2/9/2021     Patient Type Points   No documentation completed by nursing staff. [x]   0   Nursing staff documented in the navigator for an ESTABLISHED patient including Episode, Patient ID, Chief Complaint, Travel Screen, Allergies, Latex Allergy, Home Medication, History, Psychosocial Screen, C-SSRS Screen, Fall Risk, Nutritional Screen, Advanced Directive, Education and Plan of Care, and Discharge Instructions. The Functional Screening tab is only required if the patient's status changes. []   1   Nursing staff documented in the navigator for a NEW patient including Patient ID, Chief Complaint, Travel Screen, Allergies, Latex Allergy, Home Medication, History, Psychosocial Screen, C-SSRS Screen, Fall Risk, Nutritional Screen, Advanced Directive, Functional Screen, Education and Plan of Care, and Discharge Instructions. []   2   Nursing staff documented in the navigator for a CONSULT patient including Episode, Patient ID, Chief Complaint, Travel Screen, Allergies, Latex Allergy, Home Medication, History, Psychosocial Screen, C-SSRS Screen, Fall Risk, Nutritional Screen, Advanced Directive, Functional Screen, Education and Plan of Care, and Discharge Instructions. []   2     Wound Description Points   Unable to obtain image of Wound. For example, patient/caregiver is instructed not to remove dressing, is unable to correctly position smart phone, no smart phone is available, patient is unable to maintain connectivity or the patient's wound is healed. [x]   0   1-3 wound images annotated. Images of the wound(s) is obtained and annotated along with completed description in [address] [place] [address]. []   1   4-5 wound images annotated. Images of the wound(s) is obtained and annotated along with completed description in [address] [place] [address].  []   2 Nursing contacts 5 or more outside resource. For example, telephone calls made to home health, primary care providers, pharmacy, or DME. May include filling out forms and writing letters, arranging transportation, communication with insurance , vendors, etc.  Discharge, instructions and/or After Visit Summary given to patient/caregiver and instructions completed.    []   3     Is this the Patient's First Visit to the [address] [place] [address] Road  No    Is this Patient Established @ [facility]             Virtual Visit Clinical Level of Care Wound Care      Points  1-3  Level 1 [x]     Points  4-5  Level 2 []     Points  6-7  Level 3 []     Points  8-9  Level 4 []     Points  10-11  Level 5 []       Electronically signed by Linda Ford RN on 2/9/2021 at @NO

## 2021-02-19 ENCOUNTER — OFFICE VISIT (OUTPATIENT)
Dept: ORTHOPEDIC SURGERY | Age: 46
End: 2021-02-19
Payer: COMMERCIAL

## 2021-02-19 VITALS
RESPIRATION RATE: 13 BRPM | TEMPERATURE: 96.9 F | OXYGEN SATURATION: 99 % | HEIGHT: 67 IN | HEART RATE: 107 BPM | WEIGHT: 210 LBS | BODY MASS INDEX: 32.96 KG/M2

## 2021-02-19 DIAGNOSIS — M25.872 IMPINGEMENT SYNDROME OF LEFT ANKLE: Primary | ICD-10-CM

## 2021-02-19 DIAGNOSIS — M19.079 ARTHRITIS OF SUBTALAR JOINT: ICD-10-CM

## 2021-02-19 DIAGNOSIS — M24.573 EQUINUS CONTRACTURE OF ANKLE: ICD-10-CM

## 2021-02-19 DIAGNOSIS — T14.8XXD DELAYED WOUND HEALING: ICD-10-CM

## 2021-02-19 PROCEDURE — 99213 OFFICE O/P EST LOW 20 MIN: CPT | Performed by: ORTHOPAEDIC SURGERY

## 2021-02-19 NOTE — PROGRESS NOTES
[facility]  [facility]  [address] [address]  Dept: [phone]    Ambulatory Orthopedic Follow Up Visit    Preoperative Diagnosis:   1. 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 Left ankle anterior ankle impingement  3. Left ankle equinus contracture  4. Fibromyalgia  5. Body mass index is 34.46 kg/m².     Postoperative Diagnosis:   1. Same      Procedures Performed:  (59/58/6832)  1. Left foot subtalar fusion   2. Left calcaneal osteotomy  3. Left percutaneous tendoachilles lengthening, performed through separate incision        CHIEF COMPLAINT:    Chief Complaint   Patient presents with    Ankle Pain     Left Ankle         HISTORY OF PRESENT ILLNESS:       She is a 39 y.o. female, seen again today in the office for follow up of the above problem with a history of pain at the above location. The pain is described mainly with mechanical terms (dull/sharp/throbbing). The pain is worse with activity and better with rest. Since being seen last, the patient is doing better. At today's visit, she is using crutches. She has been going to wound care, and reports that she was recently discharged by their office. She denies any further drainage, and reports that her wound is healed. She is ambulating today in regular shoes without a brace, and is using one crutch. She does report some pain with ambulation, and reports that it has been improving with time. REVIEW OF SYSTEMS:   Constitutional: Negative for fever. HENT: Negative for tinnitus. Eyes: Negative for pain. Respiratory: Negative for shortness of breath. Cardiovascular: Negative for chest pain. Gastrointestinal: Negative for abdominal pain. Genitourinary: Negative for dysuria. Skin: Negative for rash. Neurological: Negative for headaches. Hematological: Does not bruise/bleed easily. Musculoskeletal: See HPI for pertinent positives     Past Medical History:    She  has a past medical history of Anxiety, Depression, Fibromyalgia, and Wears contact lenses. Past Surgical History:    She  has a past surgical history that includes Breast surgery (Right) and arthrodesis (Left, 10/23/2020). Current Medications:     Current Outpatient Medications:     Multiple Vitamins-Minerals (THERAPEUTIC MULTIVITAMIN-MINERALS) tablet, Take 1 tablet by mouth daily, Disp: , Rfl:     acetaminophen (TYLENOL) 500 MG tablet, Take 2 tablets by mouth every 8 hours for 7 days Do not exceed 4g of Acetaminophen in 24 hrs (including all sources), Disp: 30 tablet, Rfl: 0    aspirin 325 MG EC tablet, Take 1 tablet by mouth daily, Disp: 42 tablet, Rfl: 0    citalopram (CELEXA) 40 MG tablet, Take 40 mg by mouth, Disp: , Rfl:     gabapentin (NEURONTIN) 300 MG capsule, 3 tabs q HS, Disp: , Rfl:     hydrOXYzine (VISTARIL) 25 MG capsule, Take 25 mg by mouth, Disp: , Rfl:      Allergies:    Codeine    Family History:  family history includes Alcohol Abuse in her brother and mother; Colon Cancer in her brother; Diabetes in her brother and father; Heart Disease in her brother; High Blood Pressure in her brother and mother; High Cholesterol in her mother; Hypertension in her mother; Other in her father. Social History:   Social History     Occupational History    Not on file   Tobacco Use    Smoking status: Never Smoker    Smokeless tobacco: Never Used   Substance and Sexual Activity    Alcohol use:  Yes     Alcohol/week: 1.0 standard drinks     Types: 1 Glasses of wine per week    Drug use: No    Sexual activity: Not on file       OBJECTIVE:  Pulse 107   Temp 96.9 °F (36.1 °C)   Resp 13   Ht 5' 7\" (1.702 m)   Wt 210 lb (95.3 kg)   SpO2 99%   BMI 32.89 kg/m²    Psych: alert and oriented to person, time, and place  Cardio:  well perfused extremities  Resp:  normal respiratory effort  Skin:  no cyanosis Hem/lymph:  no lymphedema  Neuro:  sensation to light touch unchanged since last visit  Musculoskeletal:    Incisions clean/dry/intact aside from below  Sensation to light touch grossly intact throughout   Warm and well perfused  Grossly neurovascularly intact distally  No signs of infection  -[place] of the wound was previous area of delayed healing has now healed, and the skin is closed; no erythema/drainage       RADIOLOGY:   2/19/2021 FINDINGS:  Three views (AP, Oblique, and Lateral) of the left foot and 2 views (axial and lateral) of the left calcaneus were obtained in the office today and reviewed, revealing no acute fracture, dislocation, or radioopaque foreign body/tumor. Intact hardware across the calcaneal osteotomy and subtalar joint. No interval displacement. Interval healing of the calcaneal osteotomy noted, questionable healing of the subtalar joint. Lucency around the subtalar screws.     IMPRESSION: Status post calcaneal osteotomy and subtalar fusion as above     Electronically signed by Geraldo Stout MD       ASSESSMENT AND PLAN:  Body mass index is 32.89 kg/m². She is status post the above (on 10/23/2020) doing well overall, but with some lateral hindfoot pain with ambulation, questionable radiographic healing of her subtalar joint as well as lucency around her screws, and a history of delayed wound healing at the apex of the extensile lateral incision (her wound has now healed with time, oral antibiotics, and the wound care clinic). I am concerned about her delayed healing, and I am considering the possibility of a deep insidious infection, and would like to help rule this out.     She has a history of left posttraumatic subtalar arthritis, with a history of a calcaneus fracture (treated nonoperatively), along with left anterior ankle impingement; this occurred secondary to an injury on 12/7/2016 at work.     Notably, she has the past medical history as above, including but not limited to the following:  Psoriasis, fibromyalgia, and anxiety/depression. We had a discussion today about the likely diagnosis and its natural history, physical exam and imaging findings, as well as various treatment options in detail. Surgically, we discussed a possible future bone grafting with revision of the hardware, possibly through a sinus Tarsi approach, to avoid using the extensile lateral incision, given her significant delay with wound healing. Orders/referrals were placed as below at today's visit. The patient was referred to physical therapy for DME eval, as she requests the use of a cane, but does report that the gains that she have at home feel unstable, and we discussed that perhaps physical therapy may be able to offer her an extra benefit and/or suggestion. I ordered the following labs to help rule out an infection: CBC with differential, CRP, and ESR. In order to know exactly how to proceed with treatment (surgical versus nonsurgical, as well as how), a CT was ordered today to evaluate her degree of subtalar joint and calcaneal osteotomy healing. This is medically necessary to evaluate the exact bony alignment/architecture. Given the worker's compensation claim associated with the patient's diagnosis, appropriate paperwork will be filled out by our office regarding today's visit. All questions were answered and the above plan was agreed upon. The patient will return to clinic after her CT scan has been obtained without x-rays. At her next visit, we will review the results of her CT scan, and possibly consider a bone stimulator, possibly again revisit the idea of revision surgery as above (and possibly consider an infectious disease referral depending on her lab results). At the patient's next visit, depending on how the patient is doing and/or new imaging/labs results, we may consider the following options:    []  Orthotic (OTC)     []  Orthotic (custom)          []  Rocker bottom shoes     []  Brace (OTC)        []  Brace (custom)             []  CAM boot        []  Night splint         []  Heel cups        []  Strap      []  Toe sleeves/splints    []  PT:                     []  Wean out of immobilization   []  Advance activity       []  Topical               []  NSAIDs          []  Janeth         []  Referral:         []  Stress xrays       []  CT         []  MRI        []  Injection:         []  Consider OR      []  Pick OR date    No follow-ups on file. No orders of the defined types were placed in this encounter. Orders Placed This Encounter   Procedures    CT ANKLE LEFT WO CONTRAST     Ankle/hindfoot: Reformats     Harvard coronal reformats using axial image at the level of the tibial fibular syndesmosis. Adjust coronal reformats plane to bisect the tibia and fibula. Collimation 2 mm. [place] the sagittal reformats plane using the same image, peripendicular to the coronal reformats plane. Collimation is 2 mm. Standing Status:   Future     Standing Expiration Date:   2/19/2022     Scheduling Instructions: Ankle must be at neutral (perpendicular to tibia) with toes pointed directly up. Please center the ankle in the scanner (to include 3 inches above tibial plafond), and include the entire foot.      Order Specific Question:   Reason for exam:     Answer:   eval Subtalar healing and calc osteotomy healing    CBC Auto Differential     Standing Status:   Future     Standing Expiration Date:   2/19/2022    C-Reactive Protein     Standing Status:   Future     Standing Expiration Date:   2/19/2022    Sedimentation Rate     Standing Status:   Future     Standing Expiration Date:   2/19/2022         Annie Molina MD  Orthopedic Surgery Please excuse any typos/errors, as this note was created with the assistance of voice recognition software. While intending to generate a document that actually reflects the content of the visit, the document can still have some errors including those of syntax and sound-a-like substitutions which may escape proof reading. In such instances, actual meaning can be extrapolated by context.

## 2021-02-22 ENCOUNTER — HOSPITAL ENCOUNTER (OUTPATIENT)
Dept: PHYSICAL THERAPY | Facility: CLINIC | Age: 46
Setting detail: THERAPIES SERIES
Discharge: HOME | End: 2021-02-22
Payer: COMMERCIAL

## 2021-02-22 ENCOUNTER — HOSPITAL ENCOUNTER (OUTPATIENT)
Dept: CT IMAGING | Facility: CLINIC | Age: 46
Discharge: HOME | End: 2021-02-24
Payer: COMMERCIAL

## 2021-02-22 ENCOUNTER — HOSPITAL ENCOUNTER (OUTPATIENT)
Facility: CLINIC | Age: 46
Discharge: HOME | End: 2021-02-22
Payer: COMMERCIAL

## 2021-02-22 DIAGNOSIS — M19.079 ARTHRITIS OF SUBTALAR JOINT: ICD-10-CM

## 2021-02-22 DIAGNOSIS — T14.8XXD DELAYED WOUND HEALING: ICD-10-CM

## 2021-02-22 DIAGNOSIS — M25.872 IMPINGEMENT SYNDROME OF LEFT ANKLE: ICD-10-CM

## 2021-02-22 DIAGNOSIS — M24.573 EQUINUS CONTRACTURE OF ANKLE: ICD-10-CM

## 2021-02-22 LAB
ABSOLUTE EOS #: 0.13 K/UL (ref 0–0.44)
ABSOLUTE IMMATURE GRANULOCYTE: <0.03 K/UL (ref 0–0.3)
ABSOLUTE LYMPH #: 2.04 K/UL (ref 1.1–3.7)
ABSOLUTE MONO #: 0.69 K/UL (ref 0.1–1.2)
BASOPHILS # BLD: 1 % (ref 0–2)
BASOPHILS ABSOLUTE: 0.05 K/UL (ref 0–0.2)
C-REACTIVE PROTEIN: <3 MG/L (ref 0–5)
DIFFERENTIAL TYPE: NORMAL
EOSINOPHILS RELATIVE PERCENT: 2 % (ref 1–4)
HCT VFR BLD CALC: 42.8 % (ref 36.3–47.1)
HEMOGLOBIN: 13.7 G/DL (ref 11.9–15.1)
IMMATURE GRANULOCYTES: 0 %
LYMPHOCYTES # BLD: 27 % (ref 24–43)
MCH RBC QN AUTO: 29.9 PG (ref 25.2–33.5)
MCHC RBC AUTO-ENTMCNC: 32 G/DL (ref 28.4–34.8)
MCV RBC AUTO: 93.4 FL (ref 82.6–102.9)
MONOCYTES # BLD: 9 % (ref 3–12)
NRBC AUTOMATED: 0 PER 100 WBC
PDW BLD-RTO: 13.3 % (ref 11.8–14.4)
PLATELET # BLD: 218 K/UL (ref 138–453)
PLATELET ESTIMATE: NORMAL
PMV BLD AUTO: 10.3 FL (ref 8.1–13.5)
RBC # BLD: 4.58 M/UL (ref 3.95–5.11)
RBC # BLD: NORMAL 10*6/UL
SEDIMENTATION RATE, ERYTHROCYTE: 10 MM (ref 0–20)
SEG NEUTROPHILS: 61 % (ref 36–65)
SEGMENTED NEUTROPHILS ABSOLUTE COUNT: 4.69 K/UL (ref 1.5–8.1)
WBC # BLD: 7.6 K/UL (ref 3.5–11.3)
WBC # BLD: NORMAL 10*3/UL

## 2021-02-22 PROCEDURE — 97110 THERAPEUTIC EXERCISES: CPT

## 2021-02-22 PROCEDURE — 85652 RBC SED RATE AUTOMATED: CPT

## 2021-02-22 PROCEDURE — 36415 COLL VENOUS BLD VENIPUNCTURE: CPT

## 2021-02-22 PROCEDURE — 86140 C-REACTIVE PROTEIN: CPT

## 2021-02-22 PROCEDURE — 85025 COMPLETE CBC W/AUTO DIFF WBC: CPT

## 2021-02-22 PROCEDURE — 73700 CT LOWER EXTREMITY W/O DYE: CPT

## 2021-02-22 PROCEDURE — 97161 PT EVAL LOW COMPLEX 20 MIN: CPT

## 2021-02-22 NOTE — CONSULTS
[x] [facility]  Outpatient Rehabilitation &  Therapy  [facility]   [place]: [phone]  F: [phone]     Physical Therapy Lower Extremity Evaluation      Date:  2021  Patient: Eusebio Cross  : 1975  MRN: [id number]  Physician: Dr Elena Beaver: [facility] 2-3xweek for 6-8 weeks  Medical Diagnosis: LLE Ankle Impingement Syndrome  Rehab Codes: M25.872, M24.573, M62.81 (Muscle Weakness), M62.9 (Disorder of Muscle), Z73.6 (Limitation of ADLs)   Onset date: 10-23-20   Next 's appt.:     Subjective:   CC/HPI:  Pt with LLE ankle subtalar OA as below, surgery on 10-23-20 as below for her foot with subtalar fusion, calcaneal osteotomy and achilles lengthening. She was seen post-op by wound care, CAM boot was recently out of the boot and uses it intermittently. She presents today with pain still present in the ankle, limited mobility/strength.          Preoperative Diagnosis:   1. 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 Left ankle anterior ankle impingement  3. Left ankle equinus contracture  4. Fibromyalgia  5. Body mass index is 34.46 kg/m².     Postoperative Diagnosis:   1. Same      Procedures Performed:  ()  1. Left foot subtalar fusion   2. Left calcaneal osteotomy  3.  Left percutaneous tendoachilles lengthening, performed through separate incision         PMHx: [] Unremarkable [] Diabetes [] HTN  [] Pacemaker   [] MI/Heart Problems [] Cancer [] Arthritis   [] Other: She  has a past surgical history that includes Breast surgery (Right) and arthrodesis (Left, 10/23/2020), Psoriasis, fibromyalgia, and anxiety/depression.               [] Refer to full medical chart  In EPIC     Tests: [] X-Ray:    [] MRI:    [] Other:     Comorbidities:   [] Obesity [] Dialysis  [] N/A   [] Asthma/COPD [] Dementia [x] Other: Fibromyalgia    [] Stroke [] Sleep apnea [] Other: [] Vascular disease [] Rheumatic disease [] Other:       Medications: [x] Refer to full medical record [] None [] Other:  Allergies:      [x] Refer to full medical record [] None [] Other:    ADL/IADL Previous level of function Current level of function Who currently assists the patient with task   Bathing  [] Independent  [] Assist [] Independent  [] Assist    Dress/grooming [] Independent  [] Assist [] Independent  [] Assist    Transfer/mobility [] Independent  [] Assist [] Independent  [] Assist    Feeding [] Independent  [] Assist [] Independent  [] Assist    Toileting [] Independent  [] Assist [] Independent  [] Assist    Driving [] Independent  [] Assist [] Independent  [] Assist    Housekeeping [] Independent  [] Assist [] Independent  [] Assist    Grocery shop/meal prep [] Independent  [] Assist [] Independent  [x] Assist Hands on the counter      Gait Prior level of function Current level of function    [] Independent  [] Assist [] Independent  [] Assist   Device: [] Independent [] Independent    [] Straight Cane [] Quad cane [] Straight Cane [x] Quad cane    [] Standard walker [] Rolling walker   [] 4 wheeled walker [] Standard walker [] Rolling walker   [x] single crutch    [] Wheelchair [] Wheelchair       Medications:  [x] Refer to full medical record [] None [] Other:  Allergies:        [x] Refer to full medical record [] None [] Other:        Martial Status Lives alone with 2 dogs    Home type Condo    Stairs from outside 2   Stairs inside Chair lift to basement    "[employer]   Job status Coordinator for patient accounts   Work Activities/duties  Working from home, computer work   Recreational Activities Swimming, walking, shopping        Pain present?  yes   Location LLE lateral ankle    Pain Rating currently 0/10   Pain at worse 2/10   Pain at best 0/10   Description of pain Dull, ache   Altered Sensation intact   What makes it worse Walking, standing   What makes it better Ice, CAM boot Symptom progression improving   Sleep ok             Objective:    ROM  ° A/P STRENGTH    Left Right Left Right   Knee Flex   4+    Ext       Ankle PF   4+    DF  (knee straight) 0  4    DF   (knee flexed)       Inv   4    Ever   4-                 TESTS (+/-) Left Right Not Tested   Ant. Drawer   []   Post. Drawer   []   Varus stress    []   Valgus Stress    []   Gastroc Equinus + + []   Talor Tilt   []      []         OBSERVATION No Deficit Deficit Comments   Posture      Forward Head [] [x]    Rounded Shoulders [] [x]    Gastroc Equinus [] [x] LLE   Palpation [] [] Pain LLE lateral malleolus, PTT    Sensation [x] []    Gait [] [x] Analysis: Decreased stance LLE, walking with no device she has short step on RLE, decreased dennys    Walking with crutch/cane: improved step length, lateral lean to the right towards crutch/cane. Patient did not use quad cane well, did not keep 4 point contact         FUNCTION Normal Difficult Unable   Sitting [x] [] []   Standing [] [x] []   Ambulation [] [x] []   Groom/Dress [x] [] []   Lift/Carry [x] [] []   Stairs [] [x] []   Bending [x] [] []         FUNCTIONAL TESTS PAIN NO PAIN COMMENTS   Step Test 4 [] []    6 [x] []    8 [] []    Squat [] []           Flexibility Normal Left tight Right tight   Hip flexor [] [x] []   quad [] [] []   HS [] [x] []   piriformis [] [] []   ITB [] [] []   gastroc [] [x] []   Soleus  [] [x] []    [] [] []    [] [] []        Functional Test: LEFS, 31/80 Score: 38% functionally impaired       Comments: Pt with decreased ankle strength and ROM noted      Assessment:      Problems:    [x] ? Pain:  [x] ? ROM:  [] ? Strength:  [] ? Function:  [] Other:          STG: (to be met in 10 treatments)  1. ? Pain: Decrease pain levels to 3/10 with ADLs   2. ? ROM: Increase flexibility and AROM limitations throughout to equal bilat to reduce difficulty with ADLs  3. ? Strength:  Increase LE strength to 5/5 WNLs throughout 4. Independent with Home Exercise Programs    LTG: (to be met in 20 treatments)  1. Improve score on assessment tool from 31/80 to 50/80 or better  2. Reduce pain levels to 1-2/10 with ADLs                   Patient goals:    Rehab Potential:  [x] Good  [] Fair  [] Poor   Suggested Professional Referral:  [x] No  [] Yes:  Barriers to Goal Achievement[de-identified]  [x] No  [] Yes:  Domestic Concerns:  [x] No  [] Yes:    Pt. Education:  [x] Plans/Goals, Risks/Benefits discussed  [x] Home exercise program    Method of Education: [x] Verbal  [x] Demo  [] Written  Comprehension of Education:  [x] Verbalizes understanding. [x] Demonstrates understanding. [x] Needs Review. [] Demonstrates/verbalizes understanding of HEP/Ed previously given. Treatment Plan:  [x] Therapeutic Exercise    [] Aquatic Therapy   [x] Manual Therapy     [] Electrical Stimulation  [x] Instruction in HEP      [] Lumbar/Cervical Traction  [x] Neuromuscular Re-education [] Cold/hotpack  [] Iontophoresis: 4 mg/mL  [x] Vasocompression (GameReady)                    Dexamethasone Sodium  [x] Gait Training             Phosphate 40-80 mAmin         []  Medication allergies reviewed for use of    Dexamethasone Sodium Phosphate 4mg/ml     with iontophoresis treatments. Pt is not allergic.     Frequency:  2 x/week for 20 visits    Todays Treatment:    Exercises:  Exercise     Reps/ Time Weight/ Level Comments         Nustep            *calf belt S            *4 way TBand ankle      *Heel/toe raises      *ankle circles                                                Other:      Specific Instructions for next treatment: advance as tolerated     Evaluation Complexity:  History (Personal factors, comorbidities) [x] 0 [] 1-2 [] 3+   Exam (limitations, restrictions) [x] 1-2 [] 3 [] 4+   Clinical presentation (progression) [x] Stable [] Evolving  [] Unstable   Decision Making [x] Low [] Moderate [] High    [x] Low Complexity [] Moderate Complexity [] High Complexity Treatment Charges: Mins Units   [x] Evaluation       [x]  Low       []  Moderate       []  High 20 1   []  Modalities     [x]  Ther Exercise 10 1   []  Manual Therapy     []  Ther Activities     []  Aquatics     []  Vasocompression     []  Other       TOTAL TREATMENT TIME: 35    Time in:1500   Time Out:1550    Electronically signed by: Julien Morales PT        Physician Signature:________________________________Date:__________________  By signing above or cosigning this note, I have reviewed this plan of care and certify a need for medically necessary rehabilitation services.      *PLEASE SIGN ABOVE AND FAX BACK ALL PAGES*

## 2021-03-02 ENCOUNTER — OFFICE VISIT (OUTPATIENT)
Dept: ORTHOPEDIC SURGERY | Age: 46
End: 2021-03-02
Payer: COMMERCIAL

## 2021-03-02 VITALS — HEIGHT: 67 IN | RESPIRATION RATE: 12 BRPM | BODY MASS INDEX: 32.96 KG/M2 | TEMPERATURE: 97.2 F | WEIGHT: 210 LBS

## 2021-03-02 DIAGNOSIS — M25.872 IMPINGEMENT SYNDROME OF LEFT ANKLE: Primary | ICD-10-CM

## 2021-03-02 DIAGNOSIS — M96.0 PSEUDARTHROSIS AFTER JOINT FUSION: ICD-10-CM

## 2021-03-02 DIAGNOSIS — M19.079 ARTHRITIS OF SUBTALAR JOINT: ICD-10-CM

## 2021-03-02 PROCEDURE — 99214 OFFICE O/P EST MOD 30 MIN: CPT | Performed by: ORTHOPAEDIC SURGERY

## 2021-03-02 NOTE — PROGRESS NOTES
[facility]  [facility]  [address] [place] [address]  Dept: [phone]    Ambulatory Orthopedic Follow Up Visit    Preoperative Diagnosis:   1. 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 Left ankle anterior ankle impingement  3. Left ankle equinus contracture  4. Fibromyalgia  5. Body mass index is 34.46 kg/m².     Postoperative Diagnosis:   1. Same      Procedures Performed:  (34/48/5924)  1. Left foot subtalar fusion   2. Left calcaneal osteotomy  3. Left percutaneous tendoachilles lengthening, performed through separate incision        CHIEF COMPLAINT:    Chief Complaint   Patient presents with    Ankle Pain     left ankle         HISTORY OF PRESENT ILLNESS:       She is a 39 y.o. female, seen again today in the office for follow up of the above problem with a history of pain at the above location. The pain is described mainly with mechanical terms (dull/sharp/throbbing). The pain is worse with activity and better with rest. Since being seen last, the patient is doing better. At today's visit, she is using crutches. She has been going to wound care, and reports that she was recently discharged by their office. She denies any further drainage, and reports that her wound is healed. She is ambulating today in regular shoes without a brace, and is using one crutch. She does report some pain with ambulation, and reports that it has been improving with time. INTERVAL HISTORY 3/2/2021:  She is seen again today in the office for follow up of a CT scan. Since being seen last, the patient is doing about the same overall. At today's visit, she is using no brace or assistive device. The location and quality of the pain have not significantly changed since the last visit. REVIEW OF SYSTEMS:   Constitutional: Negative for fever. HENT: Negative for tinnitus.     Eyes: Negative for pain. Respiratory: Negative for shortness of breath. Cardiovascular: Negative for chest pain. Gastrointestinal: Negative for abdominal pain. Genitourinary: Negative for dysuria. Skin: Negative for rash. Neurological: Negative for headaches. Hematological: Does not bruise/bleed easily. Musculoskeletal: See HPI for pertinent positives     Past Medical History:    She  has a past medical history of Anxiety, Depression, Fibromyalgia, and Wears contact lenses. Past Surgical History:    She  has a past surgical history that includes Breast surgery (Right) and arthrodesis (Left, 10/23/2020). Current Medications:     Current Outpatient Medications:     Multiple Vitamins-Minerals (THERAPEUTIC MULTIVITAMIN-MINERALS) tablet, Take 1 tablet by mouth daily, Disp: , Rfl:     acetaminophen (TYLENOL) 500 MG tablet, Take 2 tablets by mouth every 8 hours for 7 days Do not exceed 4g of Acetaminophen in 24 hrs (including all sources), Disp: 30 tablet, Rfl: 0    aspirin 325 MG EC tablet, Take 1 tablet by mouth daily, Disp: 42 tablet, Rfl: 0    citalopram (CELEXA) 40 MG tablet, Take 40 mg by mouth, Disp: , Rfl:     gabapentin (NEURONTIN) 300 MG capsule, 3 tabs q HS, Disp: , Rfl:     hydrOXYzine (VISTARIL) 25 MG capsule, Take 25 mg by mouth, Disp: , Rfl:      Allergies:    Codeine    Family History:  family history includes Alcohol Abuse in her brother and mother; Colon Cancer in her brother; Diabetes in her brother and father; Heart Disease in her brother; High Blood Pressure in her brother and mother; High Cholesterol in her mother; Hypertension in her mother; Other in her father. Social History:   Social History     Occupational History    Not on file   Tobacco Use    Smoking status: Never Smoker    Smokeless tobacco: Never Used   Substance and Sexual Activity    Alcohol use:  Yes     Alcohol/week: 1.0 standard drinks     Types: 1 Glasses of wine per week    Drug use: No    Sexual activity: Not on file       OBJECTIVE:  Temp 97.2 °F (36.2 °C)   Resp 12   Ht 5' 7\" (1.702 m)   Wt 210 lb (95.3 kg)   BMI 32.89 kg/m²    Psych: alert and oriented to person, time, and place  Cardio:  well perfused extremities  Resp:  normal respiratory effort  Skin:  no cyanosis  Hem/lymph:  no lymphedema  Neuro:  sensation to light touch unchanged since last visit  Musculoskeletal:    Incisions clean/dry/intact aside from below  Sensation to light touch grossly intact throughout   Warm and well perfused  Grossly neurovascularly intact distally  No signs of infection  -Wound completely healed       RADIOLOGY:   3/2/2021 Prior images reviewed for reference. CT images and radiology report reviewed, as below:    1. Status post calcaneal osteotomy with mature complete healing of the   osteotomy site.  No hardware complication. 2. Status post subtalar arthrodesis with severe degenerative changes and no   significant osseous fusion.  No hardware complication. 3. Small 3 x 2 mm low-grade osteochondral lesion of the central talar dome. 4. Mild to moderate Achilles tendinosis.             FINDINGS:  Three views (AP, Oblique, and Lateral) of the left foot and 2 views (axial and lateral) of the left calcaneus were obtained in the office today and reviewed, revealing no acute fracture, dislocation, or radioopaque foreign body/tumor. Intact hardware across the calcaneal osteotomy and subtalar joint. No interval displacement. Interval healing of the calcaneal osteotomy noted, questionable healing of the subtalar joint.   Lucency around the subtalar screws.     IMPRESSION: Status post calcaneal osteotomy and subtalar fusion as above     Electronically signed by Reshma Churchill MD       LABS:    Lab Results   Component Value Date    WBC 7.6 02/22/2021     Lab Results   Component Value Date    CRP <3.0 02/22/2021     Lab Results   Component Value Date    SEDRATE 10 02/22/2021       ASSESSMENT AND PLAN:  Body mass index is 32.89 kg/m². 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 Her postoperative course was also complicated by a history of delayed wound healing at the apex of the extensile lateral incision (her wound has now healed with time, oral antibiotics, and the wound care clinic). At this time, as her infectious labs as above are all normal, and her wound is healed, I do not have suspicion for an infection leading to her nonunion of the subtalar fusion. She has a history of left posttraumatic subtalar arthritis, with a history of a calcaneus fracture (treated nonoperatively), along with left anterior ankle impingement; this occurred secondary to an injury on 12/7/2016 at work.      Notably, she has the past medical history as above, including but not limited to the following:  Psoriasis, fibromyalgia, and anxiety/depression. We had a discussion today about the likely diagnosis and its natural history, physical exam and imaging findings, as well as various treatment options in detail. Surgically, we discussed a left subtalar revision fusion with bone grafting (autograft plus PDGF augment), possibly through a sinus Tarsi approach to avoid the extensile lateral incision which had a delayed wound healing at the apex. At today's visit, she is not interested in pursuing this option at this time, and would rather proceed with conservative management as below. Orders/referrals were placed as below at today's visit. She will continue to avoid pain provoking activity, but she will attempt to slowly progress her activity with time. She does not need any specific immobilization, and she may ambulate with a cane as needed. I ordered a bone stimulator today (ultrasound-based) for the patient, given her subtalar joint fusion nonunion as above.  Obtaining osseous union may help avoid a possible future surgical intervention. Given the worker's compensation claim associated with the patient's diagnosis, appropriate paperwork will be filled out by our office regarding today's visit. All questions were answered and the above plan was agreed upon. The patient will return to clinic in 3 months with repeat left ankle, foot, and calcaneus x-rays. At her next visit, we will again discuss the idea of possible revision surgery depending on how she is doing and how much healing she has. We discussed that she will return to the office sooner if her pain becomes more severe or persistent, if she begins to experience wound issues again, if she experiences any change in the shape of her foot, or if she becomes concerned at all I am happy to see her back sooner. At the patient's next visit, depending on how the patient is doing and/or new imaging/labs results, we may consider the following options:    []  Orthotic (OTC)     []  Orthotic (custom)          []  Rocker bottom shoes     []  Brace (OTC)        []  Brace (custom)             []  CAM boot        []  Night splint         []  Heel cups        []  Strap      []  Toe sleeves/splints    []  PT:                     []  Wean out of immobilization   []  Advance activity       []  Topical               []  NSAIDs          []  Janeth         []  Referral:         []  Stress xrays       []  CT         []  MRI        []  Injection:         []  Consider OR      []  Pick OR date    Return in about 3 months (around 6/2/2021). No orders of the defined types were placed in this encounter. Orders Placed This Encounter   Procedures    DME Order for (Specify) as OP     - DME device ordered:  Ultrasound-based Bone Stimulator   - Length of Need: 6 Months         Candi Villarreal MD  Orthopedic Surgery        Please excuse any typos/errors, as this note was created with the assistance of voice recognition software.  While intending to generate a document that actually reflects the content of the visit, the document can still have some errors including those of syntax and sound-a-like substitutions which may escape proof reading. In such instances, actual meaning can be extrapolated by context.

## 2021-03-03 ENCOUNTER — TELEPHONE (OUTPATIENT)
Dept: ORTHOPEDIC SURGERY | Age: 46
End: 2021-03-03

## 2021-03-03 NOTE — TELEPHONE ENCOUNTER
Monserrat from [facility] LM requesting help with ICD 10 code for nonunion dx we are requesting. Attempted to call back, no answer, LM with code and direct line if she had any additional questions.

## 2021-05-25 DIAGNOSIS — M25.572 ACUTE LEFT ANKLE PAIN: Primary | ICD-10-CM

## 2021-06-04 ENCOUNTER — OFFICE VISIT (OUTPATIENT)
Dept: ORTHOPEDIC SURGERY | Age: 46
End: 2021-06-04
Payer: COMMERCIAL

## 2021-06-04 VITALS
RESPIRATION RATE: 12 BRPM | OXYGEN SATURATION: 98 % | HEART RATE: 80 BPM | HEIGHT: 67 IN | BODY MASS INDEX: 32.96 KG/M2 | WEIGHT: 210 LBS

## 2021-06-04 DIAGNOSIS — M25.872 IMPINGEMENT SYNDROME OF LEFT ANKLE: ICD-10-CM

## 2021-06-04 DIAGNOSIS — M19.079 ARTHRITIS OF SUBTALAR JOINT: ICD-10-CM

## 2021-06-04 DIAGNOSIS — M96.0 PSEUDARTHROSIS AFTER JOINT FUSION: Primary | ICD-10-CM

## 2021-06-04 PROCEDURE — 99213 OFFICE O/P EST LOW 20 MIN: CPT | Performed by: ORTHOPAEDIC SURGERY

## 2021-06-04 NOTE — PROGRESS NOTES
[facility]  [facility]  [address] [address]  Dept: [phone]    Ambulatory Orthopedic Follow Up Visit    Preoperative Diagnosis:   1. 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 Left ankle anterior ankle impingement  3. Left ankle equinus contracture  4. Fibromyalgia  5. Body mass index is 34.46 kg/m².     Postoperative Diagnosis:   1. Same      Procedures Performed:  (21/76/4588)  1. Left foot subtalar fusion   2. Left calcaneal osteotomy  3. Left percutaneous tendoachilles lengthening, performed through separate incision        CHIEF COMPLAINT:    Chief Complaint   Patient presents with    Ankle Pain     Left  Ankle         HISTORY OF PRESENT ILLNESS:       She is a 39 y.o. female, seen again today in the office for follow up of the above problem with a history of pain at the above location. The pain is described mainly with mechanical terms (dull/sharp/throbbing). The pain is worse with activity and better with rest. Since being seen last, the patient is doing better. At today's visit, she is using crutches. She has been going to wound care, and reports that she was recently discharged by their office. She denies any further drainage, and reports that her wound is healed. She is ambulating today in regular shoes without a brace, and is using one crutch. She does report some pain with ambulation, and reports that it has been improving with time. INTERVAL HISTORY 3/2/2021:  She is seen again today in the office for follow up of a CT scan. Since being seen last, the patient is doing about the same overall. At today's visit, she is using no brace or assistive device. The location and quality of the pain have not significantly changed since the last visit.      INTERVAL HISTORY 6/4/2021:  She is seen again today in the office for follow up of a previous issue Cholesterol in her mother; Hypertension in her mother; Other in her father. Social History:   Social History     Occupational History    Not on file   Tobacco Use    Smoking status: Never Smoker    Smokeless tobacco: Never Used   Vaping Use    Vaping Use: Never used   Substance and Sexual Activity    Alcohol use: Yes     Alcohol/week: 1.0 standard drinks     Types: 1 Glasses of wine per week    Drug use: No    Sexual activity: Not on file       OBJECTIVE:  Pulse 80   Resp 12   Ht 5' 7\" (1.702 m)   Wt 210 lb (95.3 kg)   SpO2 98%   BMI 32.89 kg/m²    Psych: alert and oriented to person, time, and place  Cardio:  well perfused extremities  Resp:  normal respiratory effort  Skin:  no cyanosis  Hem/lymph:  no lymphedema  Neuro:  sensation to light touch unchanged since last visit  Musculoskeletal:    Incisions clean/dry/intact aside from below  Sensation to light touch grossly intact throughout   Warm and well perfused  Grossly neurovascularly intact distally  No signs of infection  -Wound completely healed  -No gross motion of the subtalar joint      RADIOLOGY:   6/4/2021 FINDINGS:  Three weightbearing views (AP, mortise, and lateral) of the left ankle, and 3 weightbearing views (AP, Oblique, and Lateral) of the left foot and 2 weightbearing views (axial and lateral) of the left calcaneus were obtained in the office today and reviewed, revealing no acute fracture, dislocation, or radioopaque foreign body/tumor. Intact hardware across the calcaneal osteotomy and subtalar joint. No interval displacement. Interval healing of the calcaneal osteotomy noted, questionable healing of the subtalar joint.   Lucency around the subtalar screws.     IMPRESSION: Status post calcaneal osteotomy and subtalar fusion as above     Electronically signed by Sean Henry MD         CT images and radiology report reviewed, as below:    1. Status post calcaneal osteotomy with mature complete healing of the   osteotomy site.  No hardware complication. 2. Status post subtalar arthrodesis with severe degenerative changes and no   significant osseous fusion.  No hardware complication. 3. Small 3 x 2 mm low-grade osteochondral lesion of the central talar dome. 4. Mild to moderate Achilles tendinosis.             FINDINGS:  Three views (AP, Oblique, and Lateral) of the left foot and 2 views (axial and lateral) of the left calcaneus were obtained in the office today and reviewed, revealing no acute fracture, dislocation, or radioopaque foreign body/tumor. Intact hardware across the calcaneal osteotomy and subtalar joint. No interval displacement. Interval healing of the calcaneal osteotomy noted, questionable healing of the subtalar joint. Lucency around the subtalar screws.     IMPRESSION: Status post calcaneal osteotomy and subtalar fusion as above     Electronically signed by Junior Rees MD       LABS:    Lab Results   Component Value Date    WBC 7.6 02/22/2021     Lab Results   Component Value Date    CRP <3.0 02/22/2021     Lab Results   Component Value Date    SEDRATE 10 02/22/2021       ASSESSMENT AND PLAN:  Body mass index is 32.89 kg/m². 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 Her postoperative course was also complicated by a history of delayed wound healing at the apex of the extensile lateral incision (her wound has now healed with time, oral antibiotics, and wound care); her infectious labs as above remained normal, and her wound eventually healed, and there was little suspicion for an infection.        She has a history of left posttraumatic subtalar arthritis, with a history of a calcaneus fracture (treated nonoperatively), along with left anterior ankle impingement; this occurred secondary to an injury on 12/7/2016 at work.      Notably, she has the past medical history as above, including but not limited to the following:  Psoriasis, fibromyalgia, and anxiety/depression. We had a discussion today about the likely diagnosis and its natural history, physical exam and imaging findings, as well as various treatment options in detail. Surgically, we have discussed the left revision subtalar fusion with bone grafting (autograft plus PDGF augment) possibly through a sinus Tarsi approach, in order to help avoid the extensile lateral incision, which did have delayed wound healing at the apex. At this time, she declines any further surgical intervention, and wishes to continue using her bone stimulator and progressing her activity slowly with time. We discussed that she has only been using her bone stimulator for less than 1 month (she reports that there was a delay in obtaining it due to [employer] approval), there is still a chance that her subtalar fusion may heal.    Orders/referrals were placed as below at today's visit. We discussed she has no specific restrictions. She may continue to advance her activity as tolerated. No immobilization is needed for stability at this time. She may continue weight bearing as tolerated. -She will continue to use her bone stimulator daily. Given the worker's compensation claim associated with the patient's diagnosis, appropriate paperwork will be filled out by our office regarding today's visit. All questions were answered and the above plan was agreed upon. The patient will return to clinic in 3 months with left calcaneus and Broden's view x-rays. At her next visit, depending on how she is doing, we may consider a repeat CT scan and/or consider revision surgery as above; we discussed that she may return sooner as needed.                At the patient's next visit, depending on how the patient is doing and/or new imaging/labs results, we may consider the following options:    []  Orthotic (OTC)     []  Orthotic (custom)

## 2021-08-31 DIAGNOSIS — M25.572 ACUTE LEFT ANKLE PAIN: Primary | ICD-10-CM

## (undated) DEVICE — CONTAINER,SPECIMEN,OR STERILE,4OZ: Brand: MEDLINE

## (undated) DEVICE — PADDING CAST W6INXL4YD COT LO LINTING WYTEX

## (undated) DEVICE — ZIMMER® STERILE DISPOSABLE TOURNIQUET CUFF WITH PLC, DUAL PORT, SINGLE BLADDER, 30 IN. (76 CM)

## (undated) DEVICE — DRAPE C ARM UNIV W41XL74IN CLR PLAS XR VELC CLSR POLY STRP

## (undated) DEVICE — PAD,ABDOMINAL,5"X9",ST,LF,25/BX: Brand: MEDLINE INDUSTRIES, INC.

## (undated) DEVICE — DRAPE,REIN 53X77,STERILE: Brand: MEDLINE

## (undated) DEVICE — 3M™ STERI-DRAPE™ INCISE DRAPE 1050 (60CM X 45CM): Brand: STERI-DRAPE™

## (undated) DEVICE — UNTHREADED GUIDE WIRE: Brand: FIXOS

## (undated) DEVICE — TOWEL,OR,DSP,ST,BLUE,DLX,XR,4/PK,20PK/CS: Brand: MEDLINE

## (undated) DEVICE — CANNULATED DRILL

## (undated) DEVICE — DRESSING PETRO W3XL8IN OIL EMUL N ADH GZ KNIT IMPREG CELOS

## (undated) DEVICE — GLOVE SURG SZ 7 CRM LTX FREE POLYISOPRENE POLYMER BEAD ANTI

## (undated) DEVICE — WIRE FIX L152MM DIA16MM S STL 2 DMND PNT K
Type: IMPLANTABLE DEVICE | Site: ANKLE | Status: NON-FUNCTIONAL
Removed: 2020-10-23

## (undated) DEVICE — 5.5MM OVAL SOLID CARBIDE BUR LONG

## (undated) DEVICE — GAUZE,SPONGE,FLUFF,6"X6.75",STRL,5/TRAY: Brand: MEDLINE

## (undated) DEVICE — STRIP WND CLSR W1 4XL4IN POLYAMIDE MACROPOROUS NONWOVEN H2O

## (undated) DEVICE — POSITIONER HD W8XH4XL8.5IN RASPBERRY FOAM SLT

## (undated) DEVICE — BANDAGE COMPR W6INXL12FT SMOOTH FOR LIMB EXSANG ESMARCH

## (undated) DEVICE — SUTURE VCRL SZ 0 L36IN ABSRB UD L36MM CT-1 1/2 CIR J946H

## (undated) DEVICE — SMARTGOWN SURGICAL GOWN, 3XL, LONG: Brand: CONVERTORS

## (undated) DEVICE — GOWN,SIRUS,NON REINFRCD,LARGE,SET IN SL: Brand: MEDLINE

## (undated) DEVICE — ZIMMER® STERILE DISPOSABLE TOURNIQUET CUFF WITH PLC, DUAL PORT, SINGLE BLADDER, 34 IN. (86 CM)

## (undated) DEVICE — AGENT HEMSTAT 3GM OXIDIZED REGENERATED CELOS ABSRB FOR CONT (ORDER MULTIPLES OF 5EA)

## (undated) DEVICE — INTENDED FOR TISSUE SEPARATION, AND OTHER PROCEDURES THAT REQUIRE A SHARP SURGICAL BLADE TO PUNCTURE OR CUT.: Brand: BARD-PARKER ® CARBON RIB-BACK BLADES

## (undated) DEVICE — GLOVE SURG SZ 85 L12IN FNGR THK79MIL GRN LTX FREE

## (undated) DEVICE — THIN OFFSET (13.3 X 0.38 X 42.0MM)

## (undated) DEVICE — GLOVE SURG SZ 65 CRM LTX FREE POLYISOPRENE POLYMER BEAD ANTI

## (undated) DEVICE — BLANKET WRM W29.9XL79.1IN UP BODY FORC AIR MISTRAL-AIR

## (undated) DEVICE — ELECTRODE PT RET AD L9FT HI MOIST COND ADH HYDRGEL CORDED

## (undated) DEVICE — NDL CNTR 40CT FM MAG: Brand: MEDLINE INDUSTRIES, INC.

## (undated) DEVICE — C-ARMOR C-ARM EQUIPMENT COVERS CLEAR STERILE UNIVERSAL FIT 12 PER CASE: Brand: C-ARMOR

## (undated) DEVICE — GLOVE SURG SZ 8 CRM LTX FREE POLYISOPRENE POLYMER BEAD ANTI

## (undated) DEVICE — SUTURE ETHLN SZ 3-0 L18IN NONABSORBABLE BLK PS-2 L19MM 3/8 1669H

## (undated) DEVICE — GLOVE SURG SZ 8 L12IN FNGR THK79MIL GRN LTX FREE

## (undated) DEVICE — SKIN PREP TRAY W/CHG: Brand: MEDLINE INDUSTRIES, INC.

## (undated) DEVICE — DRAPE,U/ SHT,SPLIT,PLAS,STERIL: Brand: MEDLINE

## (undated) DEVICE — TUBING, SUCTION, 1/4" X 12', STRAIGHT: Brand: MEDLINE

## (undated) DEVICE — PADDING CAST W4INXL4YD SPUN DACRON POLY POR SYN VERSATILE

## (undated) DEVICE — BANDAGE COBAN 4 IN COMPR W4INXL5YD FOAM COHESIVE QUIK STK SELF ADH SFT

## (undated) DEVICE — PADDING,UNDERCAST,COTTON, 4"X4YD STERILE: Brand: MEDLINE

## (undated) DEVICE — UNTHREADED GUIDE WIRE
Type: IMPLANTABLE DEVICE | Site: ANKLE | Status: NON-FUNCTIONAL
Brand: FIXOS
Removed: 2020-10-23

## (undated) DEVICE — SST TWIST DRILL, AO TYPE, 2MM DIA. X 75MM: Brand: MICROAIRE®

## (undated) DEVICE — APPLICATOR MEDICATED 26 CC SOLUTION HI LT ORNG CHLORAPREP

## (undated) DEVICE — Device

## (undated) DEVICE — SPONGE LAP W18XL18IN WHT COT 4 PLY FLD STRUNG RADPQ DISP ST

## (undated) DEVICE — SUTURE VCRL SZ 2-0 L27IN ABSRB UD L26MM CT-2 1/2 CIR J269H

## (undated) DEVICE — GARMENT,MEDLINE,DVT,INT,CALF,MED, GEN2: Brand: MEDLINE

## (undated) DEVICE — 3M™ STERI-DRAPE™ U-DRAPE 1015: Brand: STERI-DRAPE™

## (undated) DEVICE — SMALL TEAR CROSS CUT RASP (11.0 X 5.0MM)

## (undated) DEVICE — BNDG,ELSTC,MATRIX,STRL,6"X5YD,LF,HOOK&LP: Brand: MEDLINE

## (undated) DEVICE — SUTURE VCRL SZ 0 L27IN ABSRB UD L26MM CT-2 1/2 CIR J270H

## (undated) DEVICE — GLOVE SURG SZ 75 L12IN FNGR THK79MIL GRN LTX FREE